# Patient Record
Sex: FEMALE | Race: WHITE | NOT HISPANIC OR LATINO | URBAN - METROPOLITAN AREA
[De-identification: names, ages, dates, MRNs, and addresses within clinical notes are randomized per-mention and may not be internally consistent; named-entity substitution may affect disease eponyms.]

---

## 2023-04-07 ENCOUNTER — INPATIENT (INPATIENT)
Facility: HOSPITAL | Age: 23
LOS: 1 days | Discharge: HOME | End: 2023-04-09
Attending: SURGERY | Admitting: SURGERY
Payer: COMMERCIAL

## 2023-04-07 VITALS
RESPIRATION RATE: 16 BRPM | TEMPERATURE: 99 F | HEART RATE: 96 BPM | SYSTOLIC BLOOD PRESSURE: 114 MMHG | OXYGEN SATURATION: 100 % | DIASTOLIC BLOOD PRESSURE: 68 MMHG

## 2023-04-07 DIAGNOSIS — K35.80 UNSPECIFIED ACUTE APPENDICITIS: ICD-10-CM

## 2023-04-07 LAB
ALBUMIN SERPL ELPH-MCNC: 4.5 G/DL — SIGNIFICANT CHANGE UP (ref 3.3–5)
ALP SERPL-CCNC: 67 U/L — SIGNIFICANT CHANGE UP (ref 40–120)
ALT FLD-CCNC: 10 U/L — SIGNIFICANT CHANGE UP (ref 4–33)
ANION GAP SERPL CALC-SCNC: 12 MMOL/L — SIGNIFICANT CHANGE UP (ref 7–14)
APPEARANCE UR: CLEAR — SIGNIFICANT CHANGE UP
APTT BLD: 29.4 SEC — SIGNIFICANT CHANGE UP (ref 27–36.3)
AST SERPL-CCNC: 14 U/L — SIGNIFICANT CHANGE UP (ref 4–32)
BACTERIA # UR AUTO: NEGATIVE — SIGNIFICANT CHANGE UP
BASOPHILS # BLD AUTO: 0.03 K/UL — SIGNIFICANT CHANGE UP (ref 0–0.2)
BASOPHILS NFR BLD AUTO: 0.3 % — SIGNIFICANT CHANGE UP (ref 0–2)
BILIRUB SERPL-MCNC: 0.4 MG/DL — SIGNIFICANT CHANGE UP (ref 0.2–1.2)
BILIRUB UR-MCNC: NEGATIVE — SIGNIFICANT CHANGE UP
BLD GP AB SCN SERPL QL: NEGATIVE — SIGNIFICANT CHANGE UP
BUN SERPL-MCNC: 7 MG/DL — SIGNIFICANT CHANGE UP (ref 7–23)
CALCIUM SERPL-MCNC: 9.5 MG/DL — SIGNIFICANT CHANGE UP (ref 8.4–10.5)
CHLORIDE SERPL-SCNC: 103 MMOL/L — SIGNIFICANT CHANGE UP (ref 98–107)
CO2 SERPL-SCNC: 22 MMOL/L — SIGNIFICANT CHANGE UP (ref 22–31)
COLOR SPEC: SIGNIFICANT CHANGE UP
CREAT SERPL-MCNC: 0.81 MG/DL — SIGNIFICANT CHANGE UP (ref 0.5–1.3)
DIFF PNL FLD: ABNORMAL
EGFR: 105 ML/MIN/1.73M2 — SIGNIFICANT CHANGE UP
EOSINOPHIL # BLD AUTO: 0.14 K/UL — SIGNIFICANT CHANGE UP (ref 0–0.5)
EOSINOPHIL NFR BLD AUTO: 1.2 % — SIGNIFICANT CHANGE UP (ref 0–6)
EPI CELLS # UR: 2 /HPF — SIGNIFICANT CHANGE UP (ref 0–5)
FLUAV AG NPH QL: SIGNIFICANT CHANGE UP
FLUBV AG NPH QL: SIGNIFICANT CHANGE UP
GLUCOSE SERPL-MCNC: 70 MG/DL — SIGNIFICANT CHANGE UP (ref 70–99)
GLUCOSE UR QL: NEGATIVE — SIGNIFICANT CHANGE UP
HCT VFR BLD CALC: 39.2 % — SIGNIFICANT CHANGE UP (ref 34.5–45)
HGB BLD-MCNC: 12.8 G/DL — SIGNIFICANT CHANGE UP (ref 11.5–15.5)
IANC: 6.73 K/UL — SIGNIFICANT CHANGE UP (ref 1.8–7.4)
IMM GRANULOCYTES NFR BLD AUTO: 0.3 % — SIGNIFICANT CHANGE UP (ref 0–0.9)
INR BLD: 1.14 RATIO — SIGNIFICANT CHANGE UP (ref 0.88–1.16)
KETONES UR-MCNC: NEGATIVE — SIGNIFICANT CHANGE UP
LEUKOCYTE ESTERASE UR-ACNC: NEGATIVE — SIGNIFICANT CHANGE UP
LIDOCAIN IGE QN: 21 U/L — SIGNIFICANT CHANGE UP (ref 7–60)
LYMPHOCYTES # BLD AUTO: 2.87 K/UL — SIGNIFICANT CHANGE UP (ref 1–3.3)
LYMPHOCYTES # BLD AUTO: 25.5 % — SIGNIFICANT CHANGE UP (ref 13–44)
MCHC RBC-ENTMCNC: 29.6 PG — SIGNIFICANT CHANGE UP (ref 27–34)
MCHC RBC-ENTMCNC: 32.7 GM/DL — SIGNIFICANT CHANGE UP (ref 32–36)
MCV RBC AUTO: 90.5 FL — SIGNIFICANT CHANGE UP (ref 80–100)
MONOCYTES # BLD AUTO: 1.46 K/UL — HIGH (ref 0–0.9)
MONOCYTES NFR BLD AUTO: 13 % — SIGNIFICANT CHANGE UP (ref 2–14)
NEUTROPHILS # BLD AUTO: 6.73 K/UL — SIGNIFICANT CHANGE UP (ref 1.8–7.4)
NEUTROPHILS NFR BLD AUTO: 59.7 % — SIGNIFICANT CHANGE UP (ref 43–77)
NITRITE UR-MCNC: NEGATIVE — SIGNIFICANT CHANGE UP
NRBC # BLD: 0 /100 WBCS — SIGNIFICANT CHANGE UP (ref 0–0)
NRBC # FLD: 0 K/UL — SIGNIFICANT CHANGE UP (ref 0–0)
PH UR: 7 — SIGNIFICANT CHANGE UP (ref 5–8)
PLATELET # BLD AUTO: 221 K/UL — SIGNIFICANT CHANGE UP (ref 150–400)
POTASSIUM SERPL-MCNC: 4.5 MMOL/L — SIGNIFICANT CHANGE UP (ref 3.5–5.3)
POTASSIUM SERPL-SCNC: 4.5 MMOL/L — SIGNIFICANT CHANGE UP (ref 3.5–5.3)
PROT SERPL-MCNC: 7.4 G/DL — SIGNIFICANT CHANGE UP (ref 6–8.3)
PROT UR-MCNC: NEGATIVE — SIGNIFICANT CHANGE UP
PROTHROM AB SERPL-ACNC: 13.3 SEC — SIGNIFICANT CHANGE UP (ref 10.5–13.4)
RBC # BLD: 4.33 M/UL — SIGNIFICANT CHANGE UP (ref 3.8–5.2)
RBC # FLD: 12.3 % — SIGNIFICANT CHANGE UP (ref 10.3–14.5)
RBC CASTS # UR COMP ASSIST: 7 /HPF — HIGH (ref 0–4)
RH IG SCN BLD-IMP: POSITIVE — SIGNIFICANT CHANGE UP
RH IG SCN BLD-IMP: POSITIVE — SIGNIFICANT CHANGE UP
RSV RNA NPH QL NAA+NON-PROBE: SIGNIFICANT CHANGE UP
SARS-COV-2 RNA SPEC QL NAA+PROBE: SIGNIFICANT CHANGE UP
SODIUM SERPL-SCNC: 137 MMOL/L — SIGNIFICANT CHANGE UP (ref 135–145)
SP GR SPEC: 1.01 — SIGNIFICANT CHANGE UP (ref 1.01–1.05)
UROBILINOGEN FLD QL: SIGNIFICANT CHANGE UP
WBC # BLD: 11.26 K/UL — HIGH (ref 3.8–10.5)
WBC # FLD AUTO: 11.26 K/UL — HIGH (ref 3.8–10.5)
WBC UR QL: 1 /HPF — SIGNIFICANT CHANGE UP (ref 0–5)

## 2023-04-07 PROCEDURE — 99282 EMERGENCY DEPT VISIT SF MDM: CPT

## 2023-04-07 PROCEDURE — 74177 CT ABD & PELVIS W/CONTRAST: CPT | Mod: 26,MG

## 2023-04-07 PROCEDURE — G1004: CPT

## 2023-04-07 PROCEDURE — 76830 TRANSVAGINAL US NON-OB: CPT | Mod: 26

## 2023-04-07 PROCEDURE — 99285 EMERGENCY DEPT VISIT HI MDM: CPT

## 2023-04-07 RX ORDER — ACETAMINOPHEN 500 MG
975 TABLET ORAL ONCE
Refills: 0 | Status: COMPLETED | OUTPATIENT
Start: 2023-04-07 | End: 2023-04-07

## 2023-04-07 RX ORDER — SODIUM CHLORIDE 9 MG/ML
1000 INJECTION, SOLUTION INTRAVENOUS ONCE
Refills: 0 | Status: COMPLETED | OUTPATIENT
Start: 2023-04-07 | End: 2023-04-07

## 2023-04-07 RX ORDER — PIPERACILLIN AND TAZOBACTAM 4; .5 G/20ML; G/20ML
3.38 INJECTION, POWDER, LYOPHILIZED, FOR SOLUTION INTRAVENOUS ONCE
Refills: 0 | Status: COMPLETED | OUTPATIENT
Start: 2023-04-07 | End: 2023-04-07

## 2023-04-07 RX ADMIN — Medication 975 MILLIGRAM(S): at 16:37

## 2023-04-07 RX ADMIN — PIPERACILLIN AND TAZOBACTAM 200 GRAM(S): 4; .5 INJECTION, POWDER, LYOPHILIZED, FOR SOLUTION INTRAVENOUS at 21:17

## 2023-04-07 RX ADMIN — SODIUM CHLORIDE 1000 MILLILITER(S): 9 INJECTION, SOLUTION INTRAVENOUS at 21:17

## 2023-04-07 RX ADMIN — Medication 975 MILLIGRAM(S): at 17:07

## 2023-04-07 NOTE — ED PROVIDER NOTE - OBJECTIVE STATEMENT
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 Pt notes that pain started yesterday in her B/L upper abd and rt flank and has now localized to her RLQ. Reports 1 episode of NBNB vomiting last night. Pt went to urgent care today where they found blood in her urine and sent her here to r/o appendicitis. She is currently sexually active on OCP without protection in monogamous relationship. Denies fevers, chills, nausea, dysuria, freq, urgency, vaginal discharge, foul odors.

## 2023-04-07 NOTE — CONSULT NOTE ADULT - SUBJECTIVE AND OBJECTIVE BOX
GYN Consult Note    23y G_P_  LMP * presents with   HPI:      OB/GYN HISTORY:   Physician:   Ob:   - G1:   - G2:   Gyn: Denies fibroids, cysts, PCOS, endometriosis, or history of genital lesions   PMH: Denies  PAST MEDICAL & SURGICAL HISTORY:  Hirschsprung's disease      No significant past surgical history        PSH: Denies   Meds:  MEDICATIONS  (STANDING):    MEDICATIONS  (PRN):    Allergies:   Allergies    No Known Allergies    Intolerances          REVIEW OF SYSTEMS  General: denies fevers, chills, tiredness  Skin/Breast: denies breast pain  Respiratory and Thorax: denies shortness of breath or cough  Cardiovascular: denies chest pain, palpitations  Gastrointestinal: denies abdominal pain, nausea/ vomiting	  Genitourinary: denies dysuria, increased urinary frequency, urgency, abnormal vaginal discharge,   Constitutional, Cardiovascular, Respiratory, Gastrointestinal, Genitourinary, Musculoskeletal and Integumentary review of systems are normal except as noted. 	      Vital Signs Last 24 Hrs  T(C): 36.7 (2023 17:36), Max: 37.2 (2023 13:50)  T(F): 98 (2023 17:36), Max: 98.9 (2023 13:50)  HR: 65 (2023 17:36) (65 - 96)  BP: 105/64 (2023 17:36) (105/64 - 114/68)  BP(mean): --  RR: 16 (2023 17:36) (16 - 16)  SpO2: 100% (2023 17:36) (100% - 100%)    Parameters below as of 2023 17:36  Patient On (Oxygen Delivery Method): room air        PHYSICAL EXAM: Chaperoned w/ RN at bedside.   Gen: NAD, alert and oriented x 3  Cardiovascular: regular   Respiratory: breathing comfortably on RA  Abd: soft, non tender, non-distended  Pelvic: closed/long, no CMT, Uterus: normal size, non tender  Adnexa: non tender, no palpable masses  Extremities: NTBL  Skin: warm and well perfused      LABS:                        12.8   11.26 )-----------( 221      ( 2023 17:00 )             39.2     04-07    137  |  103  |  7   ----------------------------<  70  4.5   |  22  |  0.81    Ca    9.5      2023 17:00    TPro  7.4  /  Alb  4.5  /  TBili  0.4  /  DBili  x   /  AST  14  /  ALT  10  /  AlkPhos  67  04-07      Urinalysis Basic - ( 2023 17:00 )    Color: Light Yellow / Appearance: Clear / S.012 / pH: x  Gluc: x / Ketone: Negative  / Bili: Negative / Urobili: <2 mg/dL   Blood: x / Protein: Negative / Nitrite: Negative   Leuk Esterase: Negative / RBC: 7 /HPF / WBC 1 /HPF   Sq Epi: x / Non Sq Epi: x / Bacteria: Negative        RADIOLOGY & ADDITIONAL STUDIES:   GYN Consult Note    23y G0 LMP 3/28 presents with abdominal pain x2 days, now radiating to the RLQ   HPI: Pt reports sharp abdominal pain that started yesturday that initally radiated to her back, now moved down to her RLQ       OB/GYN HISTORY:   Physician:   Ob:   - G1:   - G2:   Gyn: Denies fibroids, cysts, PCOS, endometriosis, or history of genital lesions   PMH: Denies  PAST MEDICAL & SURGICAL HISTORY:  Hirschsprung's disease      No significant past surgical history        PSH: Denies   Meds:  MEDICATIONS  (STANDING):    MEDICATIONS  (PRN):    Allergies:   Allergies    No Known Allergies    Intolerances          REVIEW OF SYSTEMS  General: denies fevers, chills, tiredness  Skin/Breast: denies breast pain  Respiratory and Thorax: denies shortness of breath or cough  Cardiovascular: denies chest pain, palpitations  Gastrointestinal: denies abdominal pain, nausea/ vomiting	  Genitourinary: denies dysuria, increased urinary frequency, urgency, abnormal vaginal discharge,   Constitutional, Cardiovascular, Respiratory, Gastrointestinal, Genitourinary, Musculoskeletal and Integumentary review of systems are normal except as noted. 	      Vital Signs Last 24 Hrs  T(C): 36.7 (2023 17:36), Max: 37.2 (2023 13:50)  T(F): 98 (2023 17:36), Max: 98.9 (2023 13:50)  HR: 65 (2023 17:36) (65 - 96)  BP: 105/64 (2023 17:36) (105/64 - 114/68)  BP(mean): --  RR: 16 (2023 17:36) (16 - 16)  SpO2: 100% (2023 17:36) (100% - 100%)    Parameters below as of 2023 17:36  Patient On (Oxygen Delivery Method): room air        PHYSICAL EXAM: Chaperoned w/ RN at bedside.   Gen: NAD, alert and oriented x 3  Cardiovascular: regular   Respiratory: breathing comfortably on RA  Abd: soft, non tender, non-distended  Pelvic: closed/long, no CMT, Uterus: normal size, non tender  Adnexa: non tender, no palpable masses  Extremities: NTBL  Skin: warm and well perfused      LABS:                        12.8   11.26 )-----------( 221      ( 2023 17:00 )             39.2     04-07    137  |  103  |  7   ----------------------------<  70  4.5   |  22  |  0.81    Ca    9.5      2023 17:00    TPro  7.4  /  Alb  4.5  /  TBili  0.4  /  DBili  x   /  AST  14  /  ALT  10  /  AlkPhos  67  04-07      Urinalysis Basic - ( 2023 17:00 )    Color: Light Yellow / Appearance: Clear / S.012 / pH: x  Gluc: x / Ketone: Negative  / Bili: Negative / Urobili: <2 mg/dL   Blood: x / Protein: Negative / Nitrite: Negative   Leuk Esterase: Negative / RBC: 7 /HPF / WBC 1 /HPF   Sq Epi: x / Non Sq Epi: x / Bacteria: Negative        RADIOLOGY & ADDITIONAL STUDIES:   GYN Consult Note    23y G0 LMP 3/28 presents with abdominal pain x2 days, now radiating to the RLQ   HPI: Pt reports sharp abdominal pain that started yesterday that initially radiated to her back, now moved down to her RLQ. Associated lower abdominal pressure and bloating. Baseline nausea with emesis x1 overnight. Last ate at 10pm. Denies pelvic pain, vaginal bleeding, or vaginal discharge. Remainder of ROS as below       OB/GYN HISTORY:   Physician: Dr. Barth   Ob: Never pregnant   Gyn: +HPV, now cleared. Denies fibroids, cysts, PCOS, endometriosis, or history of genital lesions   PMH: Denies  PAST MEDICAL & SURGICAL HISTORY:  Hirschsprung's disease      No significant past surgical history        PSH: Denies   Meds:  MEDICATIONS  (STANDING):    MEDICATIONS  (PRN):    Allergies:   Allergies    No Known Allergies    Intolerances          REVIEW OF SYSTEMS  General: denies fevers, chills, tiredness  Skin/Breast: denies breast pain  Respiratory and Thorax: denies shortness of breath or cough  Cardiovascular: denies chest pain, palpitations  Gastrointestinal: denies abdominal pain, nausea/ vomiting	  Genitourinary: denies dysuria, increased urinary frequency, urgency, abnormal vaginal discharge,   Constitutional, Cardiovascular, Respiratory, Gastrointestinal, Genitourinary, Musculoskeletal and Integumentary review of systems are normal except as noted. 	      Vital Signs Last 24 Hrs  T(C): 36.7 (2023 17:36), Max: 37.2 (2023 13:50)  T(F): 98 (2023 17:36), Max: 98.9 (2023 13:50)  HR: 65 (2023 17:36) (65 - 96)  BP: 105/64 (2023 17:36) (105/64 - 114/68)  BP(mean): --  RR: 16 (2023 17:36) (16 - 16)  SpO2: 100% (2023 17:36) (100% - 100%)    Parameters below as of 2023 17:36  Patient On (Oxygen Delivery Method): room air        PHYSICAL EXAM: Chaperoned w/ RN at bedside.   Gen: NAD, alert and oriented x 3  Cardiovascular: regular   Respiratory: breathing comfortably on RA  Abd: soft, non tender, non-distended  Pelvic: closed/long, no CMT, Uterus: normal size, non tender  Adnexa: non tender, no palpable masses  Extremities: NTBL  Skin: warm and well perfused      LABS:                        12.8   11.26 )-----------( 221      ( 2023 17:00 )             39.2     04-    137  |  103  |  7   ----------------------------<  70  4.5   |  22  |  0.81    Ca    9.5      2023 17:00    TPro  7.4  /  Alb  4.5  /  TBili  0.4  /  DBili  x   /  AST  14  /  ALT  10  /  AlkPhos  67  04-07      Urinalysis Basic - ( 2023 17:00 )    Color: Light Yellow / Appearance: Clear / S.012 / pH: x  Gluc: x / Ketone: Negative  / Bili: Negative / Urobili: <2 mg/dL   Blood: x / Protein: Negative / Nitrite: Negative   Leuk Esterase: Negative / RBC: 7 /HPF / WBC 1 /HPF   Sq Epi: x / Non Sq Epi: x / Bacteria: Negative        RADIOLOGY & ADDITIONAL STUDIES:   GYN Consult Note    23y G0 LMP 3/28 presents with abdominal pain x2 days, now radiating to the RLQ   HPI: Pt reports sharp abdominal pain that started yesterday that initially radiated to her back, now moved down to her RLQ. Associated lower abdominal pressure and bloating. Baseline nausea with emesis x1 overnight. Last ate at 10pm. Denies pelvic pain, vaginal bleeding, or vaginal discharge. Remainder of ROS as below       OB/GYN HISTORY:   Physician: Dr. Barth   Ob: Never pregnant   Gyn: +HPV, now cleared. Denies fibroids, cysts, PCOS, endometriosis, or history of genital lesions   PMH: Hirschsprung's disease s/p surgical correction as a    PSH: Denies   Meds: OCPs - takes continuously   Allergies: No Known Allergies      REVIEW OF SYSTEMS  General: denies fevers, chills, tiredness  Respiratory and Thorax: denies shortness of breath or cough  Cardiovascular: denies chest pain, palpitations  Gastrointestinal: As above  Genitourinary: denies dysuria, increased urinary frequency, urgency, abnormal vaginal discharge,   Constitutional, Cardiovascular, Respiratory, Gastrointestinal, Genitourinary, Musculoskeletal and Integumentary review of systems are normal except as noted. 	      Vital Signs Last 24 Hrs  T(C): 36.7 (2023 17:36), Max: 37.2 (2023 13:50)  T(F): 98 (2023 17:36), Max: 98.9 (2023 13:50)  HR: 65 (2023 17:36) (65 - 96)  BP: 105/64 (2023 17:36) (105/64 - 114/68)  BP(mean): --  RR: 16 (2023 17:36) (16 - 16)  SpO2: 100% (2023 17:36) (100% - 100%)    Parameters below as of 2023 17:36  Patient On (Oxygen Delivery Method): room air    PHYSICAL EXAM: Chaperoned w/ RN at bedside.   Gen: NAD, alert and oriented x 3  Cardiovascular: regular   Respiratory: breathing comfortably on RA  Abd: soft, tender in RLQ at the level of iliac crest, no tenderness lower. No rebound or guarding   Pelvic: closed/long, no CMT, Uterus: normal size, non tender  Adnexa: non tender, no palpable masses  Extremities: NTBL  Skin: warm and well perfused    LABS:                        12.8   11.26 )-----------( 221      ( 2023 17:00 )             39.2         137  |  103  |  7   ----------------------------<  70  4.5   |  22  |  0.81    Ca    9.5      2023 17:00    TPro  7.4  /  Alb  4.5  /  TBili  0.4  /  DBili  x   /  AST  14  /  ALT  10  /  AlkPhos  67  04-      Urinalysis Basic - ( 2023 17:00 )    Color: Light Yellow / Appearance: Clear / S.012 / pH: x  Gluc: x / Ketone: Negative  / Bili: Negative / Urobili: <2 mg/dL   Blood: x / Protein: Negative / Nitrite: Negative   Leuk Esterase: Negative / RBC: 7 /HPF / WBC 1 /HPF   Sq Epi: x / Non Sq Epi: x / Bacteria: Negative        RADIOLOGY & ADDITIONAL STUDIES:    < from: CT Abdomen and Pelvis w/ IV Cont (23 @ 18:52) >  PROCEDURE:  CT of the Abdomen and Pelvis was performed.  Sagittal and coronal reformats were performed.    FINDINGS:  LOWER CHEST: Within normal limits.    LIVER: Within normal limits.  BILE DUCTS: Normal caliber.  GALLBLADDER: Within normal limits.  SPLEEN: Within normal limits.  PANCREAS: Within normal limits.  ADRENALS: Within normal limits.  KIDNEYS/URETERS: Within normal limits.    BLADDER: Within normal limits.  REPRODUCTIVE ORGANS: Uterus and left adnexa within normal limits. Air   containing tubular structure right adnexa appears separate from the ovary   and separate from the colon raising suspicion of infected air filled   fallopian tube.    BOWEL: No bowel obstruction. Appendix is enlarged measuring up to 1.5 cm   with mural thickening. A 0.5 cm appendicolith is noted (2:77). There is a   small abscess at the tip of the appendix measuring up to 2.6 cm.  PERITONEUM: No ascites.  VESSELS: Within normal limits. Circumaortic left renal vein, a normal   variant  RETROPERITONEUM/LYMPH NODES: Prominent right lower quadrant lymph nodes,   for reference measuring 1.1 x 1.0 cm (2:64).  ABDOMINAL WALL: Within normal limits.  BONES: Within normal limits.    IMPRESSION:  Appendicitis with fecalith and small associated abscess at the tip  Tubular air-containing structure in the right pelvis posterior to the   right ovary and adjacent to the sigmoid colon suggestive of infected air   filled fallopian tube.    < end of copied text >  < from: US Transvaginal (23 @ 21:44) >    INTERPRETATION:  CLINICAL INFORMATION: Right lower quadrant pain with   appendicitis on CT and questionable collection.    LMP: 3/20/2023    COMPARISON: Same day CT abdomen pelvis    TECHNIQUE:  Endovaginal pelvic sonogram only. Color and Spectral Doppler was   performed.    FINDINGS:  Uterus: 6.9 cm x 2.5 cm x 2.9 cm. Within normal limits.  Endometrium: 4 mm. Within normal limits.    Rightovary: 2.9 cm x 1.8 cm x 2.4 cm. Within normal limits. Normal   arterial and venous waveforms.  Left ovary: 3.1 cm x 1.2 cm x 3.1 cm. Within normal limits. Normal   arterial and venous waveforms.    Adnexa: No discrete collections in the right or left adnexa. In the   region of the right adnexa, there is large volume of air and contrast   signature suggesting small bowel loop. No hydrosalpinx identified.    Miscellaneous: Layering debris in the urinary bladder.    Fluid: None.    IMPRESSION:    Normal-appearing uterus and ovaries. No sonographic evidence of ovarian   torsion.    No discrete collections in the right or left adnexa. Suggestion of small   bowel loop in the region of the right adnexa.    Layering debris in the urinary bladder, correlate with urinalysis for   infection.    --- End of Report ---    < end of copied text >

## 2023-04-07 NOTE — ED PROVIDER NOTE - CLINICAL SUMMARY MEDICAL DECISION MAKING FREE TEXT BOX
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 She is currently sexually active on OCP without protection in monogamous relationship. PE reveals soft, non-distended abd w/ TTP to RLQ without rebound tenderness. Bimanual examination revealed no significant findings. Plan for labs, hcg, supportive care and CT A/P to r/o appendicitis and will consider transvaginal US with reassessment. 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 She is currently sexually active on OCP without protection in monogamous relationship. PE reveals soft, non-distended abd w/ TTP to RLQ without rebound tenderness. Bimanual examination revealed no significant findings. Plan for labs, hcg, supportive care and CT A/P to r/o appendicitis and will consider transvaginal US with reassessment.

## 2023-04-07 NOTE — CONSULT NOTE ADULT - ATTENDING COMMENTS
GYN Attending    24 y/o G0 presenting with RLQ pain and appendicitis. No apparent GYN pathology  GYN available if needed for OR    N Sample-MD Jorge

## 2023-04-07 NOTE — ED PROVIDER NOTE - NS ED ATTENDING STATEMENT MOD
This was a shared visit with the ROBIN. I reviewed and verified the documentation and independently performed the documented:

## 2023-04-07 NOTE — ED ADULT NURSE NOTE - OBJECTIVE STATEMENT
Pt is a 22 y/o Female, A&Ox4, ambulatory with PMH of Hirschsprung's disease with abdominal surgery as a child. Pt presents to the ED with c/o RLQ abdominal pain x 2 days. Pt states pain is worsening, rating 5/10. Pt denies chest pain, SOB, fever, cough, chills, diarrhea, constipation, nausea, vomiting, numbness/tingling, or any urinary symptoms at this time. Respirations even and unlabored, chest rise equal b/l. Abdomen soft, nondistended, and tender in RLQ. No rebound tenderness noted on assessment. 20g IVL placed in LAC. Labs collected and sent. Medications administered as ordered, see MAR. Safety maintained throughout. Will continue to monitor.

## 2023-04-07 NOTE — ED ADULT TRIAGE NOTE - CHIEF COMPLAINT QUOTE
Pt sent from urgent care for r/o appendicitis. C/o several days of RLQ pain and one episode of vomiting during the night, denies nausea at this time.

## 2023-04-07 NOTE — ED ADULT NURSE REASSESSMENT NOTE - NS ED NURSE REASSESS COMMENT FT1
Patient A&Ox4, respirations even and unlabored, states improvement in condition, denies any complaints. Vitals stable. Medications provided per orders. Patient taken to US at this time.

## 2023-04-07 NOTE — CONSULT NOTE ADULT - ASSESSMENT
23y G0 LMP 3/28 presents with abdominal pain x2 days, now radiating to the RLQ. Pt found to have appendicitis. GYN consulted with concern for "infected air filled fallopian tube" on CT scan    - Further characterization of the fallopian tube with TVUS which states "No discrete collections in the right or left adnexa. In the region of the right adnexa, there is large volume of air and contrast   signature suggesting small bowel loop. No hydrosalpinx identified.  - Pt with out adnexal tenderness or gyn symptoms.   - Management of appendicitis per primary team     DW: Dr. Renetta Worthy, pgy-2

## 2023-04-08 DIAGNOSIS — R10.31 RIGHT LOWER QUADRANT PAIN: ICD-10-CM

## 2023-04-08 LAB
ANION GAP SERPL CALC-SCNC: 20 MMOL/L — HIGH (ref 7–14)
BUN SERPL-MCNC: 11 MG/DL — SIGNIFICANT CHANGE UP (ref 7–23)
CALCIUM SERPL-MCNC: 9.1 MG/DL — SIGNIFICANT CHANGE UP (ref 8.4–10.5)
CHLORIDE SERPL-SCNC: 101 MMOL/L — SIGNIFICANT CHANGE UP (ref 98–107)
CO2 SERPL-SCNC: 15 MMOL/L — LOW (ref 22–31)
CREAT SERPL-MCNC: 0.77 MG/DL — SIGNIFICANT CHANGE UP (ref 0.5–1.3)
EGFR: 111 ML/MIN/1.73M2 — SIGNIFICANT CHANGE UP
GLUCOSE SERPL-MCNC: 44 MG/DL — CRITICAL LOW (ref 70–99)
HCT VFR BLD CALC: 38.9 % — SIGNIFICANT CHANGE UP (ref 34.5–45)
HGB BLD-MCNC: 12.5 G/DL — SIGNIFICANT CHANGE UP (ref 11.5–15.5)
MAGNESIUM SERPL-MCNC: 1.9 MG/DL — SIGNIFICANT CHANGE UP (ref 1.6–2.6)
MCHC RBC-ENTMCNC: 29.1 PG — SIGNIFICANT CHANGE UP (ref 27–34)
MCHC RBC-ENTMCNC: 32.1 GM/DL — SIGNIFICANT CHANGE UP (ref 32–36)
MCV RBC AUTO: 90.7 FL — SIGNIFICANT CHANGE UP (ref 80–100)
NRBC # BLD: 0 /100 WBCS — SIGNIFICANT CHANGE UP (ref 0–0)
NRBC # FLD: 0 K/UL — SIGNIFICANT CHANGE UP (ref 0–0)
PHOSPHATE SERPL-MCNC: 3.4 MG/DL — SIGNIFICANT CHANGE UP (ref 2.5–4.5)
PLATELET # BLD AUTO: 184 K/UL — SIGNIFICANT CHANGE UP (ref 150–400)
POTASSIUM SERPL-MCNC: 4 MMOL/L — SIGNIFICANT CHANGE UP (ref 3.5–5.3)
POTASSIUM SERPL-SCNC: 4 MMOL/L — SIGNIFICANT CHANGE UP (ref 3.5–5.3)
RBC # BLD: 4.29 M/UL — SIGNIFICANT CHANGE UP (ref 3.8–5.2)
RBC # FLD: 12.3 % — SIGNIFICANT CHANGE UP (ref 10.3–14.5)
SODIUM SERPL-SCNC: 136 MMOL/L — SIGNIFICANT CHANGE UP (ref 135–145)
WBC # BLD: 9.33 K/UL — SIGNIFICANT CHANGE UP (ref 3.8–10.5)
WBC # FLD AUTO: 9.33 K/UL — SIGNIFICANT CHANGE UP (ref 3.8–10.5)

## 2023-04-08 PROCEDURE — 99223 1ST HOSP IP/OBS HIGH 75: CPT

## 2023-04-08 RX ORDER — IBUPROFEN 200 MG
400 TABLET ORAL EVERY 6 HOURS
Refills: 0 | Status: DISCONTINUED | OUTPATIENT
Start: 2023-04-08 | End: 2023-04-09

## 2023-04-08 RX ORDER — PIPERACILLIN AND TAZOBACTAM 4; .5 G/20ML; G/20ML
3.38 INJECTION, POWDER, LYOPHILIZED, FOR SOLUTION INTRAVENOUS EVERY 8 HOURS
Refills: 0 | Status: DISCONTINUED | OUTPATIENT
Start: 2023-04-08 | End: 2023-04-09

## 2023-04-08 RX ORDER — ACETAMINOPHEN 500 MG
975 TABLET ORAL EVERY 6 HOURS
Refills: 0 | Status: DISCONTINUED | OUTPATIENT
Start: 2023-04-08 | End: 2023-04-09

## 2023-04-08 RX ORDER — SODIUM CHLORIDE 9 MG/ML
1000 INJECTION, SOLUTION INTRAVENOUS
Refills: 0 | Status: DISCONTINUED | OUTPATIENT
Start: 2023-04-08 | End: 2023-04-08

## 2023-04-08 RX ORDER — ENOXAPARIN SODIUM 100 MG/ML
40 INJECTION SUBCUTANEOUS EVERY 24 HOURS
Refills: 0 | Status: DISCONTINUED | OUTPATIENT
Start: 2023-04-08 | End: 2023-04-09

## 2023-04-08 RX ORDER — IBUPROFEN 200 MG
400 TABLET ORAL ONCE
Refills: 0 | Status: COMPLETED | OUTPATIENT
Start: 2023-04-08 | End: 2023-04-08

## 2023-04-08 RX ORDER — ACETAMINOPHEN 500 MG
975 TABLET ORAL EVERY 6 HOURS
Refills: 0 | Status: DISCONTINUED | OUTPATIENT
Start: 2023-04-08 | End: 2023-04-08

## 2023-04-08 RX ORDER — ACETAMINOPHEN 500 MG
975 TABLET ORAL ONCE
Refills: 0 | Status: COMPLETED | OUTPATIENT
Start: 2023-04-08 | End: 2023-04-08

## 2023-04-08 RX ADMIN — PIPERACILLIN AND TAZOBACTAM 25 GRAM(S): 4; .5 INJECTION, POWDER, LYOPHILIZED, FOR SOLUTION INTRAVENOUS at 21:43

## 2023-04-08 RX ADMIN — Medication 975 MILLIGRAM(S): at 00:58

## 2023-04-08 RX ADMIN — PIPERACILLIN AND TAZOBACTAM 25 GRAM(S): 4; .5 INJECTION, POWDER, LYOPHILIZED, FOR SOLUTION INTRAVENOUS at 16:06

## 2023-04-08 RX ADMIN — SODIUM CHLORIDE 90 MILLILITER(S): 9 INJECTION, SOLUTION INTRAVENOUS at 09:46

## 2023-04-08 RX ADMIN — Medication 400 MILLIGRAM(S): at 00:58

## 2023-04-08 RX ADMIN — ENOXAPARIN SODIUM 40 MILLIGRAM(S): 100 INJECTION SUBCUTANEOUS at 11:43

## 2023-04-08 NOTE — H&P ADULT - ASSESSMENT
23F pmh Hirschsprung (s/p pull through/rectoplasty) presenting with abdominal pain since 6pm last night.    Plan:  - Admit to Dr. Galindo  - NPO, IVF, Abx  - Zosyn  - Ibuprofen  - Tylenol  - Dvt ppx    Iron MAHARAJ Team Surgery  [id number]

## 2023-04-08 NOTE — H&P ADULT - NSHPPHYSICALEXAM_GEN_ALL_CORE
Vital Signs Last 24 Hrs  T(C): 36.6 (04-08-23 @ 02:17), Max: 37.2 (04-07-23 @ 13:50)  T(F): 97.9 (04-08-23 @ 02:17), Max: 98.9 (04-07-23 @ 13:50)  HR: 60 (04-08-23 @ 02:17) (60 - 96)  BP: 106/60 (04-08-23 @ 02:17) (105/64 - 115/62)  BP(mean): --  RR: 18 (04-08-23 @ 02:17) (16 - 18)  SpO2: 100% (04-08-23 @ 02:17) (99% - 100%)    General: well developed, well nourished, NAD  Neuro: alert and oriented, no focal deficits, moves all extremities spontaneously  HEENT: NCAT, EOMI, anicteric, mucosa moist  Respiratory: airway patent  CVS: regular rate  Abdomen: soft, tender in RLQ  Extremities: no edema, sensation and movement grossly intact  Skin: warm, dry, appropriate color

## 2023-04-08 NOTE — H&P ADULT - HISTORY OF PRESENT ILLNESS
23F pmh Hirschsprung (s/p pull through/rectoplasty) presenting with abdominal pain since 6pm last night.    Patient reports had nachos and then afterward had generalized abdominal pain that later migrated to the RLQ, sharp and mostly continent with 10min of relief intermittently. Had one episode of emesis but since then no nause. No F/N/C. No dysuria or vaginal discharge. Pain exacerbated by bumps on car ride.    Is sexually active with one partner. Had STI in past. No tobacco use, occasional alcohol use.  CT showing 1.5cm appendix with abscess at tip, appendicolith, and possible infection r fallopian tube vs ovary.

## 2023-04-08 NOTE — H&P ADULT - TIME BILLING
Thorough assessment of abdominal pain in a patient with complicated surgical history and multiple findings in the R. lower quadrant.

## 2023-04-08 NOTE — H&P ADULT - NSHPLABSRESULTS_GEN_ALL_CORE
----------  LABORATORY DATA:  ----------                        12.8   11.26 )-----------( 221      ( 2023 17:00 )             39.2                   137  |  103  |  7   ----------------------------<  70  4.5   |  22  |  0.81    Ca    9.5      2023 17:00    TPro  7.4  /  Alb  4.5  /  TBili  0.4  /  DBili  x   /  AST  14  /  ALT  10  /  AlkPhos  67      LIVER FUNCTIONS - ( 2023 17:00 )  Alb: 4.5 g/dL / Pro: 7.4 g/dL / ALK PHOS: 67 U/L / ALT: 10 U/L / AST: 14 U/L / GGT: x           PT/INR - ( 2023 20:00 )   PT: 13.3 sec;   INR: 1.14 ratio         PTT - ( 2023 20:00 )  PTT:29.4 sec  Urinalysis Basic - ( 2023 17:00 )    Color: Light Yellow / Appearance: Clear / S.012 / pH: x  Gluc: x / Ketone: Negative  / Bili: Negative / Urobili: <2 mg/dL   Blood: x / Protein: Negative / Nitrite: Negative   Leuk Esterase: Negative / RBC: 7 /HPF / WBC 1 /HPF   Sq Epi: x / Non Sq Epi: x / Bacteria: Negative        < from: CT Abdomen and Pelvis w/ IV Cont (23 @ 18:52) >    INTERPRETATION:  CLINICAL INFORMATION: Right lower quadrant pain.    COMPARISON: None.    CONTRAST/COMPLICATIONS:  IV Contrast: Omnipaque 350  90 cc administered   10 cc discarded  Oral Contrast: NONE  Complications: None reported at time of study completion    PROCEDURE:  CT of the Abdomen and Pelvis was performed.  Sagittal and coronal reformats were performed.    FINDINGS:  LOWER CHEST: Within normal limits.    LIVER: Within normal limits.  BILE DUCTS: Normal caliber.  GALLBLADDER: Within normal limits.  SPLEEN: Within normal limits.  PANCREAS: Within normal limits.  ADRENALS: Within normal limits.  KIDNEYS/URETERS: Within normal limits.    BLADDER: Within normal limits.  REPRODUCTIVE ORGANS: Uterus and left adnexa within normal limits. Air   containing tubular structure right adnexa appears separate from the ovary   and separate from the colon raising suspicion of infected air filled   fallopian tube.    BOWEL: No bowel obstruction. Appendix is enlarged measuring up to 1.5 cm   with mural thickening. A 0.5 cm appendicolith is noted (2:77). There is a   small abscess at the tip of the appendix measuring up to 2.6 cm.  PERITONEUM: No ascites.  VESSELS: Within normal limits. Circumaortic left renal vein, a normal   variant  RETROPERITONEUM/LYMPH NODES: Prominent right lower quadrant lymph nodes,   for reference measuring 1.1 x 1.0 cm (2:64).  ABDOMINAL WALL: Within normal limits.  BONES: Within normal limits.    IMPRESSION:  Appendicitis with fecalith and small associated abscess at the tip  Tubular air-containing structure in the right pelvis posterior to the   right ovary and adjacent to the sigmoid colon suggestive of infected air   filled fallopian tube.    < end of copied text >

## 2023-04-08 NOTE — PROVIDER CONTACT NOTE (OTHER) - ASSESSMENT
patient aox4, npo since midnight, patient is not diabetic, denies dizziness, vomiting or sweating. FS was done it was 49

## 2023-04-08 NOTE — H&P ADULT - ATTENDING COMMENTS
Abdominal pain and findings of small abscess near tip of appendix, possible infected fallopian tube.  Complicated sugical history.  Normal WBC.    -possible OR vs. Abx / observation

## 2023-04-09 ENCOUNTER — TRANSCRIPTION ENCOUNTER (OUTPATIENT)
Age: 23
End: 2023-04-09

## 2023-04-09 VITALS
RESPIRATION RATE: 17 BRPM | HEART RATE: 64 BPM | DIASTOLIC BLOOD PRESSURE: 57 MMHG | OXYGEN SATURATION: 100 % | SYSTOLIC BLOOD PRESSURE: 99 MMHG | TEMPERATURE: 98 F

## 2023-04-09 LAB
ANION GAP SERPL CALC-SCNC: 12 MMOL/L — SIGNIFICANT CHANGE UP (ref 7–14)
BUN SERPL-MCNC: 7 MG/DL — SIGNIFICANT CHANGE UP (ref 7–23)
CALCIUM SERPL-MCNC: 9.2 MG/DL — SIGNIFICANT CHANGE UP (ref 8.4–10.5)
CHLORIDE SERPL-SCNC: 106 MMOL/L — SIGNIFICANT CHANGE UP (ref 98–107)
CO2 SERPL-SCNC: 19 MMOL/L — LOW (ref 22–31)
CREAT SERPL-MCNC: 0.84 MG/DL — SIGNIFICANT CHANGE UP (ref 0.5–1.3)
EGFR: 100 ML/MIN/1.73M2 — SIGNIFICANT CHANGE UP
GLUCOSE SERPL-MCNC: 95 MG/DL — SIGNIFICANT CHANGE UP (ref 70–99)
HCT VFR BLD CALC: 36.4 % — SIGNIFICANT CHANGE UP (ref 34.5–45)
HGB BLD-MCNC: 12.3 G/DL — SIGNIFICANT CHANGE UP (ref 11.5–15.5)
MAGNESIUM SERPL-MCNC: 1.8 MG/DL — SIGNIFICANT CHANGE UP (ref 1.6–2.6)
MCHC RBC-ENTMCNC: 30.4 PG — SIGNIFICANT CHANGE UP (ref 27–34)
MCHC RBC-ENTMCNC: 33.8 GM/DL — SIGNIFICANT CHANGE UP (ref 32–36)
MCV RBC AUTO: 89.9 FL — SIGNIFICANT CHANGE UP (ref 80–100)
NRBC # BLD: 0 /100 WBCS — SIGNIFICANT CHANGE UP (ref 0–0)
NRBC # FLD: 0 K/UL — SIGNIFICANT CHANGE UP (ref 0–0)
PHOSPHATE SERPL-MCNC: 3 MG/DL — SIGNIFICANT CHANGE UP (ref 2.5–4.5)
PLATELET # BLD AUTO: 195 K/UL — SIGNIFICANT CHANGE UP (ref 150–400)
POTASSIUM SERPL-MCNC: 3.9 MMOL/L — SIGNIFICANT CHANGE UP (ref 3.5–5.3)
POTASSIUM SERPL-SCNC: 3.9 MMOL/L — SIGNIFICANT CHANGE UP (ref 3.5–5.3)
RBC # BLD: 4.05 M/UL — SIGNIFICANT CHANGE UP (ref 3.8–5.2)
RBC # FLD: 12.3 % — SIGNIFICANT CHANGE UP (ref 10.3–14.5)
SODIUM SERPL-SCNC: 137 MMOL/L — SIGNIFICANT CHANGE UP (ref 135–145)
WBC # BLD: 9.54 K/UL — SIGNIFICANT CHANGE UP (ref 3.8–10.5)
WBC # FLD AUTO: 9.54 K/UL — SIGNIFICANT CHANGE UP (ref 3.8–10.5)

## 2023-04-09 PROCEDURE — 99232 SBSQ HOSP IP/OBS MODERATE 35: CPT

## 2023-04-09 RX ORDER — IBUPROFEN 200 MG
1 TABLET ORAL
Qty: 0 | Refills: 0 | DISCHARGE
Start: 2023-04-09

## 2023-04-09 RX ORDER — ACETAMINOPHEN 500 MG
3 TABLET ORAL
Qty: 0 | Refills: 0 | DISCHARGE
Start: 2023-04-09

## 2023-04-09 RX ADMIN — ENOXAPARIN SODIUM 40 MILLIGRAM(S): 100 INJECTION SUBCUTANEOUS at 11:31

## 2023-04-09 RX ADMIN — PIPERACILLIN AND TAZOBACTAM 25 GRAM(S): 4; .5 INJECTION, POWDER, LYOPHILIZED, FOR SOLUTION INTRAVENOUS at 05:21

## 2023-04-09 NOTE — DISCHARGE NOTE PROVIDER - NSDCCPCAREPLAN_GEN_ALL_CORE_FT
PRINCIPAL DISCHARGE DIAGNOSIS  Diagnosis: Appendicitis, acute  Assessment and Plan of Treatment: IV antibiotics given

## 2023-04-09 NOTE — DISCHARGE NOTE PROVIDER - CARE PROVIDER_API CALL
Mason Galindo)  ColonRectal Surgery; Surgery  [address], [address]  [place] [address]  Phone: [phone]  Fax: [phone]  Follow Up Time: 2 weeks

## 2023-04-09 NOTE — DISCHARGE NOTE PROVIDER - NSDCMRMEDTOKEN_GEN_ALL_CORE_FT
acetaminophen 325 mg oral tablet: 3 tab(s) orally every 6 hours As needed Severe Pain (7 - 10)  ibuprofen 400 mg oral tablet: 1 tab(s) orally every 6 hours As needed Severe Pain (7 - 10)   acetaminophen 325 mg oral tablet: 3 tab(s) orally every 6 hours As needed Severe Pain (7 - 10)  amoxicillin-clavulanate 875 mg-125 mg oral tablet: 1 tab(s) orally every 12 hours  ibuprofen 400 mg oral tablet: 1 tab(s) orally every 6 hours As needed Severe Pain (7 - 10)

## 2023-04-09 NOTE — DISCHARGE NOTE NURSING/CASE MANAGEMENT/SOCIAL WORK - PATIENT PORTAL LINK FT
You can access the FollowMyHealth Patient Portal offered by [facility] by registering at the following website: [email or web address]. By joining [employer]’s FollowMyHealth portal, you will also be able to view your health information using other applications (apps) compatible with our system.

## 2023-04-09 NOTE — DISCHARGE NOTE PROVIDER - HOSPITAL COURSE
Patient here with acute appendicitis with microperforation treates with conservative therapy, IV antibiotics. Diet was slowly advanced as tolerated and pain was under controlled, pt remained afebril and hemodynamically stable. At the time of discharge, the patient was hemodynamically stable, was tolerating PO diet, was voiding urine and passing stool.  SHe was ambulating and was comfortable with adequate pain control.  The patient was instructed to follow up with Dr. Galindo within 2 weeks and to complete a 2 week course of antibiotics at home after discharge from the hospital.  The patient / family felt comfortable with discharge.  The patient had no other issues. Per attending, patient deemed medically stable and ready for discharge at this time.

## 2023-04-13 LAB
CULTURE RESULTS: SIGNIFICANT CHANGE UP
CULTURE RESULTS: SIGNIFICANT CHANGE UP
SPECIMEN SOURCE: SIGNIFICANT CHANGE UP
SPECIMEN SOURCE: SIGNIFICANT CHANGE UP

## 2023-04-18 ENCOUNTER — INPATIENT (INPATIENT)
Facility: HOSPITAL | Age: 23
LOS: 10 days | Discharge: HOME | End: 2023-04-29
Attending: SURGERY | Admitting: SURGERY
Payer: COMMERCIAL

## 2023-04-18 VITALS
OXYGEN SATURATION: 98 % | DIASTOLIC BLOOD PRESSURE: 55 MMHG | SYSTOLIC BLOOD PRESSURE: 102 MMHG | TEMPERATURE: 100 F | HEART RATE: 100 BPM | RESPIRATION RATE: 18 BRPM | HEIGHT: 59 IN

## 2023-04-18 DIAGNOSIS — K35.33 ACUTE APPENDICITIS WITH PERFORATION, LOCALIZED PERITONITIS, AND GANGRENE, WITH ABSCESS: ICD-10-CM

## 2023-04-18 LAB
ALBUMIN SERPL ELPH-MCNC: 3.8 G/DL — SIGNIFICANT CHANGE UP (ref 3.3–5)
ALP SERPL-CCNC: 68 U/L — SIGNIFICANT CHANGE UP (ref 40–120)
ALT FLD-CCNC: 9 U/L — SIGNIFICANT CHANGE UP (ref 4–33)
ANION GAP SERPL CALC-SCNC: 10 MMOL/L — SIGNIFICANT CHANGE UP (ref 7–14)
APTT BLD: 29.7 SEC — SIGNIFICANT CHANGE UP (ref 27–36.3)
AST SERPL-CCNC: 9 U/L — SIGNIFICANT CHANGE UP (ref 4–32)
B PERT DNA SPEC QL NAA+PROBE: SIGNIFICANT CHANGE UP
B PERT+PARAPERT DNA PNL SPEC NAA+PROBE: SIGNIFICANT CHANGE UP
BASOPHILS # BLD AUTO: 0.04 K/UL — SIGNIFICANT CHANGE UP (ref 0–0.2)
BASOPHILS NFR BLD AUTO: 0.2 % — SIGNIFICANT CHANGE UP (ref 0–2)
BILIRUB SERPL-MCNC: <0.2 MG/DL — SIGNIFICANT CHANGE UP (ref 0.2–1.2)
BORDETELLA PARAPERTUSSIS (RAPRVP): SIGNIFICANT CHANGE UP
BUN SERPL-MCNC: 7 MG/DL — SIGNIFICANT CHANGE UP (ref 7–23)
C PNEUM DNA SPEC QL NAA+PROBE: SIGNIFICANT CHANGE UP
CALCIUM SERPL-MCNC: 9.6 MG/DL — SIGNIFICANT CHANGE UP (ref 8.4–10.5)
CHLORIDE SERPL-SCNC: 101 MMOL/L — SIGNIFICANT CHANGE UP (ref 98–107)
CO2 SERPL-SCNC: 23 MMOL/L — SIGNIFICANT CHANGE UP (ref 22–31)
CREAT SERPL-MCNC: 0.7 MG/DL — SIGNIFICANT CHANGE UP (ref 0.5–1.3)
CRP SERPL-MCNC: 275.3 MG/L — HIGH
EGFR: 125 ML/MIN/1.73M2 — SIGNIFICANT CHANGE UP
EOSINOPHIL # BLD AUTO: 0.05 K/UL — SIGNIFICANT CHANGE UP (ref 0–0.5)
EOSINOPHIL NFR BLD AUTO: 0.3 % — SIGNIFICANT CHANGE UP (ref 0–6)
FLUAV SUBTYP SPEC NAA+PROBE: SIGNIFICANT CHANGE UP
FLUBV RNA SPEC QL NAA+PROBE: SIGNIFICANT CHANGE UP
GLUCOSE SERPL-MCNC: 116 MG/DL — HIGH (ref 70–99)
HADV DNA SPEC QL NAA+PROBE: SIGNIFICANT CHANGE UP
HCG SERPL-ACNC: <5 MIU/ML — SIGNIFICANT CHANGE UP
HCOV 229E RNA SPEC QL NAA+PROBE: SIGNIFICANT CHANGE UP
HCOV HKU1 RNA SPEC QL NAA+PROBE: SIGNIFICANT CHANGE UP
HCOV NL63 RNA SPEC QL NAA+PROBE: SIGNIFICANT CHANGE UP
HCOV OC43 RNA SPEC QL NAA+PROBE: SIGNIFICANT CHANGE UP
HCT VFR BLD CALC: 33.4 % — LOW (ref 34.5–45)
HGB BLD-MCNC: 11.3 G/DL — LOW (ref 11.5–15.5)
HMPV RNA SPEC QL NAA+PROBE: SIGNIFICANT CHANGE UP
HPIV1 RNA SPEC QL NAA+PROBE: SIGNIFICANT CHANGE UP
HPIV2 RNA SPEC QL NAA+PROBE: SIGNIFICANT CHANGE UP
HPIV3 RNA SPEC QL NAA+PROBE: SIGNIFICANT CHANGE UP
HPIV4 RNA SPEC QL NAA+PROBE: SIGNIFICANT CHANGE UP
IANC: 13.73 K/UL — HIGH (ref 1.8–7.4)
IMM GRANULOCYTES NFR BLD AUTO: 0.3 % — SIGNIFICANT CHANGE UP (ref 0–0.9)
INR BLD: 1.22 RATIO — HIGH (ref 0.88–1.16)
LIDOCAIN IGE QN: 11 U/L — SIGNIFICANT CHANGE UP (ref 7–60)
LYMPHOCYTES # BLD AUTO: 13.9 % — SIGNIFICANT CHANGE UP (ref 13–44)
LYMPHOCYTES # BLD AUTO: 2.7 K/UL — SIGNIFICANT CHANGE UP (ref 1–3.3)
M PNEUMO DNA SPEC QL NAA+PROBE: SIGNIFICANT CHANGE UP
MCHC RBC-ENTMCNC: 30 PG — SIGNIFICANT CHANGE UP (ref 27–34)
MCHC RBC-ENTMCNC: 33.8 GM/DL — SIGNIFICANT CHANGE UP (ref 32–36)
MCV RBC AUTO: 88.6 FL — SIGNIFICANT CHANGE UP (ref 80–100)
MONOCYTES # BLD AUTO: 2.86 K/UL — HIGH (ref 0–0.9)
MONOCYTES NFR BLD AUTO: 14.7 % — HIGH (ref 2–14)
NEUTROPHILS # BLD AUTO: 13.73 K/UL — HIGH (ref 1.8–7.4)
NEUTROPHILS NFR BLD AUTO: 70.6 % — SIGNIFICANT CHANGE UP (ref 43–77)
NRBC # BLD: 0 /100 WBCS — SIGNIFICANT CHANGE UP (ref 0–0)
NRBC # FLD: 0 K/UL — SIGNIFICANT CHANGE UP (ref 0–0)
PLATELET # BLD AUTO: 380 K/UL — SIGNIFICANT CHANGE UP (ref 150–400)
POTASSIUM SERPL-MCNC: 4.2 MMOL/L — SIGNIFICANT CHANGE UP (ref 3.5–5.3)
POTASSIUM SERPL-SCNC: 4.2 MMOL/L — SIGNIFICANT CHANGE UP (ref 3.5–5.3)
PROT SERPL-MCNC: 7.8 G/DL — SIGNIFICANT CHANGE UP (ref 6–8.3)
PROTHROM AB SERPL-ACNC: 14.2 SEC — HIGH (ref 10.5–13.4)
RAPID RVP RESULT: SIGNIFICANT CHANGE UP
RBC # BLD: 3.77 M/UL — LOW (ref 3.8–5.2)
RBC # FLD: 12.1 % — SIGNIFICANT CHANGE UP (ref 10.3–14.5)
RSV RNA SPEC QL NAA+PROBE: SIGNIFICANT CHANGE UP
RV+EV RNA SPEC QL NAA+PROBE: SIGNIFICANT CHANGE UP
SARS-COV-2 RNA SPEC QL NAA+PROBE: SIGNIFICANT CHANGE UP
SODIUM SERPL-SCNC: 134 MMOL/L — LOW (ref 135–145)
WBC # BLD: 19.43 K/UL — HIGH (ref 3.8–10.5)
WBC # FLD AUTO: 19.43 K/UL — HIGH (ref 3.8–10.5)

## 2023-04-18 PROCEDURE — 99222 1ST HOSP IP/OBS MODERATE 55: CPT

## 2023-04-18 PROCEDURE — 74177 CT ABD & PELVIS W/CONTRAST: CPT | Mod: 26,MA

## 2023-04-18 PROCEDURE — 99285 EMERGENCY DEPT VISIT HI MDM: CPT

## 2023-04-18 RX ORDER — SODIUM CHLORIDE 9 MG/ML
1000 INJECTION INTRAMUSCULAR; INTRAVENOUS; SUBCUTANEOUS ONCE
Refills: 0 | Status: COMPLETED | OUTPATIENT
Start: 2023-04-18 | End: 2023-04-18

## 2023-04-18 RX ORDER — METOCLOPRAMIDE HCL 10 MG
10 TABLET ORAL ONCE
Refills: 0 | Status: COMPLETED | OUTPATIENT
Start: 2023-04-18 | End: 2023-04-18

## 2023-04-18 RX ORDER — SODIUM CHLORIDE 9 MG/ML
1000 INJECTION INTRAMUSCULAR; INTRAVENOUS; SUBCUTANEOUS
Refills: 0 | Status: DISCONTINUED | OUTPATIENT
Start: 2023-04-18 | End: 2023-04-19

## 2023-04-18 RX ORDER — PIPERACILLIN AND TAZOBACTAM 4; .5 G/20ML; G/20ML
3.38 INJECTION, POWDER, LYOPHILIZED, FOR SOLUTION INTRAVENOUS ONCE
Refills: 0 | Status: COMPLETED | OUTPATIENT
Start: 2023-04-18 | End: 2023-04-18

## 2023-04-18 RX ORDER — KETOROLAC TROMETHAMINE 30 MG/ML
15 SYRINGE (ML) INJECTION ONCE
Refills: 0 | Status: DISCONTINUED | OUTPATIENT
Start: 2023-04-18 | End: 2023-04-18

## 2023-04-18 RX ADMIN — SODIUM CHLORIDE 1000 MILLILITER(S): 9 INJECTION INTRAMUSCULAR; INTRAVENOUS; SUBCUTANEOUS at 20:51

## 2023-04-18 RX ADMIN — PIPERACILLIN AND TAZOBACTAM 200 GRAM(S): 4; .5 INJECTION, POWDER, LYOPHILIZED, FOR SOLUTION INTRAVENOUS at 23:38

## 2023-04-18 RX ADMIN — Medication 15 MILLIGRAM(S): at 20:51

## 2023-04-18 RX ADMIN — SODIUM CHLORIDE 125 MILLILITER(S): 9 INJECTION INTRAMUSCULAR; INTRAVENOUS; SUBCUTANEOUS at 23:38

## 2023-04-18 RX ADMIN — Medication 10 MILLIGRAM(S): at 20:51

## 2023-04-18 NOTE — ED ADULT NURSE NOTE - OBJECTIVE STATEMENT
Pt A&Ox4 ambulatory, no PMH, presenting to the ED (Intake 2) c/o RLQ. Pt  has been experiencing RLQ x appro 2 weeks. Pt  came to the ED about 2 weeks with same complaints,  was diagnosed with appendicitis and was dioscharged with PO antibiotics. Pt  Friday started to experience fevers. Pt  was referred from PCP to come to the ED for further evaluation. Denies any other complaints. Respirations are even and unlabored, NAD, 20G IV RAC, labs sent, rvp sent, medicated as per orders, Safety precautions implemented as per protocol, awaiting further MD orders, will continue with plan of care.

## 2023-04-18 NOTE — H&P ADULT - HISTORY OF PRESENT ILLNESS
23F pmh Hirschsprung (s/p pull through/rectoplasty) and recent hx (1 week ago) of perforated appendicitis with appendicolith and possible infection extending to fallopian tube tx presenting with IV abx presenting 1 with abdominal pain, fever to 101 and nausea for last two days. Currently on PO Augmentin course post hospitalization, set to finish on Friday.  Reports poor PO intake. Passing flatus and having BM. CT with noted worsened appendicitis with persistent appendicolith and possible 3.5 x 2.5 abscess.

## 2023-04-18 NOTE — ED PROVIDER NOTE - OBJECTIVE STATEMENT
23-year-old female with no pertinent past medical history presents with complaints of right lower quadrant abdominal pain and intermittent fevers x4 days.  Patient was diagnosed with appendicitis with microperforations on April 7, 2023 and was treated conservatively with IV antibiotics.  Patient was discharged home with Augmentin which she has been taking as prescribed but has not yet completed.  Patient reports decrease in appetite secondary to fever but denies vomiting, diarrhea.  Patient notes 1 episode of constipation that has since resolved.  Pt also c/o intermittent HA. No associated urinary symptoms, chest pain, shortness of breath, or cough.  Patient is scheduled to see Dr. Galindo, as an outpatient tomorrow.  No other voiced complaints.

## 2023-04-18 NOTE — H&P ADULT - ASSESSMENT
23F pmh Hirschsprung (s/p pull through/rectoplasty) and recent hx (1 week ago) of perforated appendicitis with appendicolith and possible infection extending to fallopian tube tx presenting with IV abx presenting 1 with abdominal pain, fever to 101 and nausea for last two days. Currently on PO Augmentin course post hospitalization, set to finish on Friday.  Reports poor PO intake. Passing flatus and having BM. CT with noted worsened appendicitis with persistent appendicolith and possible 3.5 x 2.5 abscess. Noted infection at falopian tube secondaryt o infection, cannot r/o fallopian-enteric fistula per imaging    - admit to Dr. Jeff, [facility]  - NPO/IVF  - IV abx  - IR consult  - pain control    d/w Dr. Johnson, surgery fellow on behalf of attending    A Team, [id number]

## 2023-04-18 NOTE — H&P ADULT - NSHPPHYSICALEXAM_GEN_ALL_CORE
PHYSICAL EXAM:    GENERAL: NAD, well-groomed, well-developed  NECK: Supple, No JVD, Normal thyroid  HEART: Regular rate and rhythm; No murmurs, rubs, or gallops  RESPIRATORY: CTA B/L, No W/R/R  ABDOMEN: Soft, focally tender in RLQ with involuntary guarding

## 2023-04-18 NOTE — ED PROVIDER NOTE - ATTENDING APP SHARED VISIT CONTRIBUTION OF CARE
Brief HPI:  22-year-old female no past medical history presents with right lower quadrant abdominal pain and fevers for the last 4 days.  Patient seen in ED on April 7, 2023 with CT showing appendicitis and was treated with antibiotics without surgery and discharged on Augmentin.    Vitals:   Reviewed    Exam:    GEN:  Non-toxic appearing, non-distressed, speaking full sentences, non-diaphoretic, AAOx3  HEENT:  NCAT, neck supple, EOMI, PERRLA, sclera anicteric, no conjunctival pallor or injection, no stridor, normal voice, no tonsillar exudate, uvula midline  CV:  regular rhythm and rate, s1/s2 audible, no murmurs, rubs or gallops, peripheral pulses 2+ and symmetric  PULM:  non-labored respirations, lungs clear to auscultation bilaterally, no wheezes, crackles or rales  ABD:  non distended, Right lower quadrant tenderness, no rebound, no guarding, negative Torres's sign, bowel sounds normal, no cvat  MSK:  no gross deformity, non-tender extremities and joints, range of motion grossly normal appearing, no extremity edema, extremities warm and well perfused   NEURO:  AAOx3, CN II-XII intact, motor 5/5 in upper and lower extremities bilaterally, sensation grossly intact in extremities and trunk, finger to nose testing wnl, no nystagmus, negative Romberg, no pronator drift, no gait deficit  SKIN:  warm, dry, no rash or vesicles     A/P:  22-year-old female no past medical history presents with right lower quadrant abdominal pain and fevers for the last 4 days.  Vital signs stable.  Exam nontoxic-appearing, tenderness in the right lower quadrant abdomen but nonperitoneal.  Suspect persistent appendicitis.  Will send labs, surgical consult, possible repeat CT abdomen pelvis.  Disposition pending.

## 2023-04-18 NOTE — ED PROVIDER NOTE - PHYSICAL EXAMINATION
CONSTITUTIONAL: Well-appearing; well-nourished; in no apparent distress. Non-toxic appearing.   NEURO: Alert & oriented. Gait steady without assistance. Sensory and motor functions are grossly intact.  PSYCH: Mood appropriate. Thought processes intact.   NECK: Supple  CARD: Regular rate and rhythm, no murmurs  RESP: No accessory muscle use; breath sounds clear and equal bilaterally; no wheezes, rhonchi, or rales     ABD: Soft; non-distended. (+) RLQ tenderness with voluntary guarding. No rebound. No peritoneal signs.   MUSCULOSKELETAL/EXTREMITIES: FROM in all four extremities; no extremity edema.  SKIN: Warm; dry; no apparent lesions or exudate

## 2023-04-18 NOTE — ED PROVIDER NOTE - CLINICAL SUMMARY MEDICAL DECISION MAKING FREE TEXT BOX
23-year-old female with no pertinent past medical history presents with complaints of right lower quadrant abdominal pain and intermittent fevers x4 days.  Patient was diagnosed with appendicitis with microperforations on April 7, 2023 and was treated conservatively with IV antibiotics.  Patient was discharged home with Augmentin which she has been taking as prescribed but has not yet completed.  Patient reports decrease in appetite secondary to fever but denies vomiting, diarrhea.  Patient notes 1 episode of constipation that has since resolved.  Also, c/o intermittent HA. No associated urinary symptoms, chest pain, shortness of breath, or cough.  Patient is scheduled to see Dr. Galindo, as an outpatient tomorrow.  No other voiced complaints.  VSS. Exam as noted above. Plan to assess labs (including preop), surgery eval, +/- CT A/P given exam without signs of peritonitis, no rebound. CT ordered for placement purposes. Dispo pending.

## 2023-04-18 NOTE — ED ADULT TRIAGE NOTE - CHIEF COMPLAINT QUOTE
p/t seen and diagnosed with appendicitis one week ago, been taking antibiotics, sent by PMD for fever for 3 days, p/t denies any nausea or vomiting

## 2023-04-18 NOTE — H&P ADULT - NSHPLABSRESULTS_GEN_ALL_CORE
< from: CT Abdomen and Pelvis w/ IV Cont (04.18.23 @ 21:53) >      BOWEL: No bowel obstruction. The appendix is enlarged and thick-walled   measuring up to 1.2 cm. A 0.5 cm appendicolith is noted (2, 78).   Increased periappendiceal inflammatory stranding is noted. Portion of the   appendix appears discontinuous, suspicious for perforation. Ill-defined   thick-walled air-fluid collection in the region of the appendiceal tip   measures 2.6 x 3.5 cm. This may be related to an adjacent loop of distal   ileum, though this is also in the region of previously noted small   abscess, and suspected to represent interval increase in size of   previously noted periappendiceal abscess. Several foci of air lateral to   this collection are of uncertain location.  PERITONEUM: Trace ascites.  VESSELS: Within normal limits. Circumaortic left renal vein.  RETROPERITONEUM/LYMPH NODES: Stable prominent right lower quadrant lymph   node measuring up to 1 cm in short axis dimension.  ABDOMINAL WALL: Within normal limits.  BONES: Within normal limits.    IMPRESSION:  Interval worsened appearance of appendicitis, with suspected interval   increase in size of previously described small periappendiceal abscess.   Additional foci of air are noted adjacent to this and are not   definitively within bowel lumen. There is also discontinuity of the   appendiceal wall, and overall findings are compatible with sequelae of   contained perforation.    Tubular air containing structure in the right adnexa is again noted. This   is nonspecific and may represent sequelae of infection, though may be   related to the fallopian tube with salpingoenteric fistula.      < end of copied text >

## 2023-04-18 NOTE — ED ADULT NURSE REASSESSMENT NOTE - NS ED NURSE REASSESS COMMENT FT1
Pt is A&O x 4, ambulatory w/o assistance, shows no signs of acute distress. VSS. Respirations even a unlabored. To be admitted for appendicitis. Anti-bx tx and IVF initiated. Pending bed assignment.

## 2023-04-18 NOTE — ED PROVIDER NOTE - NS ED ROS FT
ROS:  GENERAL: As per HPI   CARDIAC: no chest pain  PULMONARY: no cough, no shortness of breath  GI: As per HPI  (+) constipation  : No dysuria, no frequency, no change in appearance or odor of urine  SKIN: no rashes  NEURO: (+) headache, no weakness, no dizziness  MSK: No joint pain  All other systems reviewed as negative.

## 2023-04-19 ENCOUNTER — APPOINTMENT (OUTPATIENT)
Dept: COLORECTAL SURGERY | Facility: CLINIC | Age: 23
End: 2023-04-19

## 2023-04-19 ENCOUNTER — TRANSCRIPTION ENCOUNTER (OUTPATIENT)
Age: 23
End: 2023-04-19

## 2023-04-19 LAB
ANION GAP SERPL CALC-SCNC: 11 MMOL/L — SIGNIFICANT CHANGE UP (ref 7–14)
BUN SERPL-MCNC: 6 MG/DL — LOW (ref 7–23)
CALCIUM SERPL-MCNC: 8.5 MG/DL — SIGNIFICANT CHANGE UP (ref 8.4–10.5)
CHLORIDE SERPL-SCNC: 104 MMOL/L — SIGNIFICANT CHANGE UP (ref 98–107)
CO2 SERPL-SCNC: 21 MMOL/L — LOW (ref 22–31)
CREAT SERPL-MCNC: 0.64 MG/DL — SIGNIFICANT CHANGE UP (ref 0.5–1.3)
EGFR: 127 ML/MIN/1.73M2 — SIGNIFICANT CHANGE UP
GLUCOSE SERPL-MCNC: 89 MG/DL — SIGNIFICANT CHANGE UP (ref 70–99)
HCT VFR BLD CALC: 30.7 % — LOW (ref 34.5–45)
HGB BLD-MCNC: 9.9 G/DL — LOW (ref 11.5–15.5)
MAGNESIUM SERPL-MCNC: 1.9 MG/DL — SIGNIFICANT CHANGE UP (ref 1.6–2.6)
MCHC RBC-ENTMCNC: 28.7 PG — SIGNIFICANT CHANGE UP (ref 27–34)
MCHC RBC-ENTMCNC: 32.2 GM/DL — SIGNIFICANT CHANGE UP (ref 32–36)
MCV RBC AUTO: 89 FL — SIGNIFICANT CHANGE UP (ref 80–100)
NRBC # BLD: 0 /100 WBCS — SIGNIFICANT CHANGE UP (ref 0–0)
NRBC # FLD: 0 K/UL — SIGNIFICANT CHANGE UP (ref 0–0)
PHOSPHATE SERPL-MCNC: 2.7 MG/DL — SIGNIFICANT CHANGE UP (ref 2.5–4.5)
PLATELET # BLD AUTO: 370 K/UL — SIGNIFICANT CHANGE UP (ref 150–400)
POTASSIUM SERPL-MCNC: 4.7 MMOL/L — SIGNIFICANT CHANGE UP (ref 3.5–5.3)
POTASSIUM SERPL-SCNC: 4.7 MMOL/L — SIGNIFICANT CHANGE UP (ref 3.5–5.3)
RBC # BLD: 3.45 M/UL — LOW (ref 3.8–5.2)
RBC # FLD: 12.1 % — SIGNIFICANT CHANGE UP (ref 10.3–14.5)
SODIUM SERPL-SCNC: 136 MMOL/L — SIGNIFICANT CHANGE UP (ref 135–145)
WBC # BLD: 17.17 K/UL — HIGH (ref 3.8–10.5)
WBC # FLD AUTO: 17.17 K/UL — HIGH (ref 3.8–10.5)

## 2023-04-19 PROCEDURE — 99232 SBSQ HOSP IP/OBS MODERATE 35: CPT

## 2023-04-19 RX ORDER — ACETAMINOPHEN 500 MG
1000 TABLET ORAL ONCE
Refills: 0 | Status: COMPLETED | OUTPATIENT
Start: 2023-04-19 | End: 2023-04-19

## 2023-04-19 RX ORDER — HEPARIN SODIUM 5000 [USP'U]/ML
5000 INJECTION INTRAVENOUS; SUBCUTANEOUS EVERY 8 HOURS
Refills: 0 | Status: DISCONTINUED | OUTPATIENT
Start: 2023-04-19 | End: 2023-04-24

## 2023-04-19 RX ORDER — PIPERACILLIN AND TAZOBACTAM 4; .5 G/20ML; G/20ML
3.38 INJECTION, POWDER, LYOPHILIZED, FOR SOLUTION INTRAVENOUS EVERY 8 HOURS
Refills: 0 | Status: COMPLETED | OUTPATIENT
Start: 2023-04-19 | End: 2023-04-26

## 2023-04-19 RX ORDER — KETOROLAC TROMETHAMINE 30 MG/ML
15 SYRINGE (ML) INJECTION ONCE
Refills: 0 | Status: DISCONTINUED | OUTPATIENT
Start: 2023-04-19 | End: 2023-04-19

## 2023-04-19 RX ORDER — PIPERACILLIN AND TAZOBACTAM 4; .5 G/20ML; G/20ML
3.38 INJECTION, POWDER, LYOPHILIZED, FOR SOLUTION INTRAVENOUS ONCE
Refills: 0 | Status: COMPLETED | OUTPATIENT
Start: 2023-04-19 | End: 2023-04-19

## 2023-04-19 RX ORDER — SODIUM CHLORIDE 9 MG/ML
1000 INJECTION, SOLUTION INTRAVENOUS
Refills: 0 | Status: DISCONTINUED | OUTPATIENT
Start: 2023-04-19 | End: 2023-04-21

## 2023-04-19 RX ORDER — PIPERACILLIN AND TAZOBACTAM 4; .5 G/20ML; G/20ML
3.38 INJECTION, POWDER, LYOPHILIZED, FOR SOLUTION INTRAVENOUS ONCE
Refills: 0 | Status: DISCONTINUED | OUTPATIENT
Start: 2023-04-19 | End: 2023-04-19

## 2023-04-19 RX ADMIN — PIPERACILLIN AND TAZOBACTAM 25 GRAM(S): 4; .5 INJECTION, POWDER, LYOPHILIZED, FOR SOLUTION INTRAVENOUS at 21:18

## 2023-04-19 RX ADMIN — PIPERACILLIN AND TAZOBACTAM 25 GRAM(S): 4; .5 INJECTION, POWDER, LYOPHILIZED, FOR SOLUTION INTRAVENOUS at 05:03

## 2023-04-19 RX ADMIN — Medication 1000 MILLIGRAM(S): at 21:48

## 2023-04-19 RX ADMIN — Medication 400 MILLIGRAM(S): at 05:17

## 2023-04-19 RX ADMIN — Medication 1000 MILLIGRAM(S): at 12:12

## 2023-04-19 RX ADMIN — Medication 1000 MILLIGRAM(S): at 06:17

## 2023-04-19 RX ADMIN — HEPARIN SODIUM 5000 UNIT(S): 5000 INJECTION INTRAVENOUS; SUBCUTANEOUS at 14:10

## 2023-04-19 RX ADMIN — SODIUM CHLORIDE 100 MILLILITER(S): 9 INJECTION, SOLUTION INTRAVENOUS at 15:27

## 2023-04-19 RX ADMIN — HEPARIN SODIUM 5000 UNIT(S): 5000 INJECTION INTRAVENOUS; SUBCUTANEOUS at 21:18

## 2023-04-19 RX ADMIN — Medication 400 MILLIGRAM(S): at 21:18

## 2023-04-19 RX ADMIN — SODIUM CHLORIDE 100 MILLILITER(S): 9 INJECTION, SOLUTION INTRAVENOUS at 01:01

## 2023-04-19 RX ADMIN — PIPERACILLIN AND TAZOBACTAM 25 GRAM(S): 4; .5 INJECTION, POWDER, LYOPHILIZED, FOR SOLUTION INTRAVENOUS at 14:09

## 2023-04-19 RX ADMIN — HEPARIN SODIUM 5000 UNIT(S): 5000 INJECTION INTRAVENOUS; SUBCUTANEOUS at 05:20

## 2023-04-19 RX ADMIN — Medication 15 MILLIGRAM(S): at 15:18

## 2023-04-19 RX ADMIN — Medication 15 MILLIGRAM(S): at 15:33

## 2023-04-19 RX ADMIN — Medication 400 MILLIGRAM(S): at 11:58

## 2023-04-19 NOTE — PROGRESS NOTE ADULT - SUBJECTIVE AND OBJECTIVE BOX
[facility] Surgery Daily Progress Note  =====================================================    SUBJECTIVE: Patient seen and examined at bedside on AM rounds. Patient reports that they're feeling OK. NPO/Tolerating diet, denies nausea, vomiting.    Flatus/    BM. OOB/Amublating as tolerated. Denies fever, chills.    --------------------------------------------------------------------------------------  OBJECTIVE:    VITAL SIGNS:  Vital Signs Last 24 Hrs  T(C): 36.7 (19 Apr 2023 06:17), Max: 38.5 (19 Apr 2023 03:41)  T(F): 98.1 (19 Apr 2023 06:17), Max: 101.3 (19 Apr 2023 03:41)  HR: 94 (19 Apr 2023 03:41) (87 - 100)  BP: 117/64 (19 Apr 2023 03:41) (100/58 - 117/64)  BP(mean): --  RR: 17 (19 Apr 2023 03:41) (16 - 18)  SpO2: 100% (19 Apr 2023 03:41) (98% - 100%)    Parameters below as of 19 Apr 2023 03:41  Patient On (Oxygen Delivery Method): room air      --------------------------------------------------------------------------------------    EXAM:  General: NAD, resting in bed comfortably.  Cardiac: regular rate, warm and well perfused  Respiratory: Nonlabored respirations, normal cw expansion.  Abdomen: Soft, minimally tender in RLQ, nondistended    --------------------------------------------------------------------------------------    LABS:                        9.9    17.17 )-----------( 370      ( 19 Apr 2023 04:11 )             30.7     04-19    136  |  104  |  6<L>  ----------------------------<  89  4.7   |  21<L>  |  0.64    Ca    8.5      19 Apr 2023 04:11  Phos  2.7     04-19  Mg     1.90     04-19    TPro  7.8  /  Alb  3.8  /  TBili  <0.2  /  DBili  x   /  AST  9   /  ALT  9   /  AlkPhos  68  04-18    PT/INR - ( 18 Apr 2023 20:48 )   PT: 14.2 sec;   INR: 1.22 ratio         PTT - ( 18 Apr 2023 20:48 )  PTT:29.7 sec      --------------------------------------------------------------------------------------    INS AND OUTS:      --------------------------------------------------------------------------------------    MEDICATIONS:  MEDICATIONS  (STANDING):  heparin   Injectable 5000 Unit(s) SubCutaneous every 8 hours  lactated ringers. 1000 milliLiter(s) (100 mL/Hr) IV Continuous <Continuous>  piperacillin/tazobactam IVPB.. 3.375 Gram(s) IV Intermittent every 8 hours    MEDICATIONS  (PRN):    --------------------------------------------------------------------------------------   [facility] Surgery Daily Progress Note  =====================================================    SUBJECTIVE: Patient seen and examined at bedside on AM rounds. Patient reports that they're feeling OK. NPO, denies nausea, vomiting. States she is having fevers    --------------------------------------------------------------------------------------  OBJECTIVE:    VITAL SIGNS:  Vital Signs Last 24 Hrs  T(C): 36.7 (19 Apr 2023 06:17), Max: 38.5 (19 Apr 2023 03:41)  T(F): 98.1 (19 Apr 2023 06:17), Max: 101.3 (19 Apr 2023 03:41)  HR: 94 (19 Apr 2023 03:41) (87 - 100)  BP: 117/64 (19 Apr 2023 03:41) (100/58 - 117/64)  BP(mean): --  RR: 17 (19 Apr 2023 03:41) (16 - 18)  SpO2: 100% (19 Apr 2023 03:41) (98% - 100%)    Parameters below as of 19 Apr 2023 03:41  Patient On (Oxygen Delivery Method): room air      --------------------------------------------------------------------------------------    EXAM:  General: NAD, resting in bed comfortably.  Cardiac: regular rate, warm and well perfused  Respiratory: Nonlabored respirations, normal cw expansion.  Abdomen: Soft, minimally tender in RLQ, nondistended    --------------------------------------------------------------------------------------    LABS:                        9.9    17.17 )-----------( 370      ( 19 Apr 2023 04:11 )             30.7     04-19    136  |  104  |  6<L>  ----------------------------<  89  4.7   |  21<L>  |  0.64    Ca    8.5      19 Apr 2023 04:11  Phos  2.7     04-19  Mg     1.90     04-19    TPro  7.8  /  Alb  3.8  /  TBili  <0.2  /  DBili  x   /  AST  9   /  ALT  9   /  AlkPhos  68  04-18    PT/INR - ( 18 Apr 2023 20:48 )   PT: 14.2 sec;   INR: 1.22 ratio         PTT - ( 18 Apr 2023 20:48 )  PTT:29.7 sec      --------------------------------------------------------------------------------------    INS AND OUTS:      --------------------------------------------------------------------------------------    MEDICATIONS:  MEDICATIONS  (STANDING):  heparin   Injectable 5000 Unit(s) SubCutaneous every 8 hours  lactated ringers. 1000 milliLiter(s) (100 mL/Hr) IV Continuous <Continuous>  piperacillin/tazobactam IVPB.. 3.375 Gram(s) IV Intermittent every 8 hours    MEDICATIONS  (PRN):    --------------------------------------------------------------------------------------

## 2023-04-19 NOTE — PATIENT PROFILE ADULT - FALL HARM RISK - HARM RISK INTERVENTIONS

## 2023-04-19 NOTE — CONSULT NOTE ADULT - SUBJECTIVE AND OBJECTIVE BOX
Vascular & Interventional Radiology    HPI: 23y Female with Hirschsprung (s/p pull through/rectoplasty) and recent hx (1 week ago) of perforated appendicitis with appendicolith and possible infection extending to fallopian tube tx presenting with IV abx presenting 1 with abdominal pain, fever to 101 and nausea for last two days. Currently on PO Augmentin course post hospitalization, set to finish on Friday.  Reports poor PO intake. Passing flatus and having BM. CT with noted worsened appendicitis with persistent appendicolith and possible 3.5 x 2.5 abscess. Noted infection at falopian tube secondary to infection.    Allergies: No Known Allergies    Data:  T(C): 36.4  HR: 98  BP: 111/63  RR: 17  SpO2: 100%    -WBC 17.17 / HgB 9.9 / Hct 30.7 / Plt 370  -Na 136 / Cl 104 / BUN 6 / Glucose 89  -K 4.7 / CO2 21 / Cr 0.64  -INR1.22    Assessment:   23y Female w/ perforated appendicitis p/w abdominal pain, fever, and nausea. Repeat CT w/ interval enlargement of a periappendiceal abscess.    Plan:   - No safe percutaneous window for drain placement.  - Management per surgery.  - Page as needed.      --  Terry Pinzon MD, PGY-6  Vascular and Interventional Radiology  Available on [employer] Teams    - Nonemergent consults:  [facility] order "Consult- Interventional Radiology"  - Emergent issues (pager): [facility] [phone]; [facility] [phone]; [phone]  - Scheduling questions: [facility] [phone]; [facility] [phone]  - Clinic/outpatient booking: [facility] [phone]; [facility] [phone]  Vascular & Interventional Radiology    HPI: 23y Female with Hirschsprung (s/p pull through/rectoplasty) and recent hx (1 week ago) of perforated appendicitis with appendicolith and possible infection extending to fallopian tube tx presenting with IV abx presenting 1 with abdominal pain, fever to 101 and nausea for last two days. Currently on PO Augmentin course post hospitalization, set to finish on Friday.  Reports poor PO intake. Passing flatus and having BM. CT with noted worsened appendicitis with persistent appendicolith and possible 3.5 x 2.5 abscess. Noted infection at falopian tube secondary to infection.    Allergies: No Known Allergies    Data:  T(C): 36.4  HR: 98  BP: 111/63  RR: 17  SpO2: 100%    -WBC 17.17 / HgB 9.9 / Hct 30.7 / Plt 370  -Na 136 / Cl 104 / BUN 6 / Glucose 89  -K 4.7 / CO2 21 / Cr 0.64  -INR1.22    Assessment:   23y Female w/ perforated appendicitis p/w abdominal pain, fever, and nausea. Repeat CT w/ interval enlargement of a periappendiceal abscess.    Plan:   - No definitively safe percutaneous window for drain placement.  - Management per surgery.  - Page as needed.      --  Terry Pinzon MD, PGY-6  Vascular and Interventional Radiology  Available on [employer] Teams    - Nonemergent consults:  [facility] order "Consult- Interventional Radiology"  - Emergent issues (pager): [facility] [phone]; [facility] [phone]; [phone]  - Scheduling questions: [facility] [phone]; [facility] [phone]  - Clinic/outpatient booking: [facility] [phone]; [facility] [phone]

## 2023-04-19 NOTE — PROGRESS NOTE ADULT - ASSESSMENT
23F pmh Hirschsprung (s/p pull through/rectoplasty) and recent hx (1 week ago) of perforated appendicitis with appendicolith and possible infection extending to fallopian tube tx presenting with IV abx presenting 1 with abdominal pain, fever to 101 and nausea for last two days. Currently on PO Augmentin course post hospitalization, set to finish on Friday.  Reports poor PO intake. Passing flatus and having BM. CT with noted worsened appendicitis with persistent appendicolith and possible 3.5 x 2.5 abscess. Noted infection at falopian tube secondary to infection.    PLAN:  - NPO/IVF  - IV ABX: Zosyn  - IR Consult for drainage of abscess  - Pain Control    [facility] Team Surgery  [id number]

## 2023-04-20 ENCOUNTER — RESULT REVIEW (OUTPATIENT)
Age: 23
End: 2023-04-20

## 2023-04-20 ENCOUNTER — TRANSCRIPTION ENCOUNTER (OUTPATIENT)
Age: 23
End: 2023-04-20

## 2023-04-20 LAB
ANION GAP SERPL CALC-SCNC: 12 MMOL/L — SIGNIFICANT CHANGE UP (ref 7–14)
APTT BLD: 31 SEC — SIGNIFICANT CHANGE UP (ref 27–36.3)
BLD GP AB SCN SERPL QL: NEGATIVE — SIGNIFICANT CHANGE UP
BUN SERPL-MCNC: 4 MG/DL — LOW (ref 7–23)
CALCIUM SERPL-MCNC: 9.2 MG/DL — SIGNIFICANT CHANGE UP (ref 8.4–10.5)
CHLORIDE SERPL-SCNC: 102 MMOL/L — SIGNIFICANT CHANGE UP (ref 98–107)
CO2 SERPL-SCNC: 25 MMOL/L — SIGNIFICANT CHANGE UP (ref 22–31)
CREAT SERPL-MCNC: 0.77 MG/DL — SIGNIFICANT CHANGE UP (ref 0.5–1.3)
EGFR: 111 ML/MIN/1.73M2 — SIGNIFICANT CHANGE UP
GLUCOSE SERPL-MCNC: 99 MG/DL — SIGNIFICANT CHANGE UP (ref 70–99)
HCT VFR BLD CALC: 31.9 % — LOW (ref 34.5–45)
HCT VFR BLD CALC: 35.3 % — SIGNIFICANT CHANGE UP (ref 34.5–45)
HGB BLD-MCNC: 10.6 G/DL — LOW (ref 11.5–15.5)
HGB BLD-MCNC: 11.4 G/DL — LOW (ref 11.5–15.5)
INR BLD: 1.21 RATIO — HIGH (ref 0.88–1.16)
MAGNESIUM SERPL-MCNC: 2 MG/DL — SIGNIFICANT CHANGE UP (ref 1.6–2.6)
MCHC RBC-ENTMCNC: 29.8 PG — SIGNIFICANT CHANGE UP (ref 27–34)
MCHC RBC-ENTMCNC: 29.9 PG — SIGNIFICANT CHANGE UP (ref 27–34)
MCHC RBC-ENTMCNC: 32.3 GM/DL — SIGNIFICANT CHANGE UP (ref 32–36)
MCHC RBC-ENTMCNC: 33.2 GM/DL — SIGNIFICANT CHANGE UP (ref 32–36)
MCV RBC AUTO: 89.9 FL — SIGNIFICANT CHANGE UP (ref 80–100)
MCV RBC AUTO: 92.4 FL — SIGNIFICANT CHANGE UP (ref 80–100)
NRBC # BLD: 0 /100 WBCS — SIGNIFICANT CHANGE UP (ref 0–0)
NRBC # BLD: 0 /100 WBCS — SIGNIFICANT CHANGE UP (ref 0–0)
NRBC # FLD: 0 K/UL — SIGNIFICANT CHANGE UP (ref 0–0)
NRBC # FLD: 0 K/UL — SIGNIFICANT CHANGE UP (ref 0–0)
PHOSPHATE SERPL-MCNC: 3.6 MG/DL — SIGNIFICANT CHANGE UP (ref 2.5–4.5)
PLATELET # BLD AUTO: 380 K/UL — SIGNIFICANT CHANGE UP (ref 150–400)
PLATELET # BLD AUTO: 391 K/UL — SIGNIFICANT CHANGE UP (ref 150–400)
POTASSIUM SERPL-MCNC: 3.9 MMOL/L — SIGNIFICANT CHANGE UP (ref 3.5–5.3)
POTASSIUM SERPL-SCNC: 3.9 MMOL/L — SIGNIFICANT CHANGE UP (ref 3.5–5.3)
PROTHROM AB SERPL-ACNC: 14.1 SEC — HIGH (ref 10.5–13.4)
RBC # BLD: 3.55 M/UL — LOW (ref 3.8–5.2)
RBC # BLD: 3.82 M/UL — SIGNIFICANT CHANGE UP (ref 3.8–5.2)
RBC # FLD: 12.1 % — SIGNIFICANT CHANGE UP (ref 10.3–14.5)
RBC # FLD: 12.2 % — SIGNIFICANT CHANGE UP (ref 10.3–14.5)
RH IG SCN BLD-IMP: POSITIVE — SIGNIFICANT CHANGE UP
SODIUM SERPL-SCNC: 139 MMOL/L — SIGNIFICANT CHANGE UP (ref 135–145)
WBC # BLD: 16.28 K/UL — HIGH (ref 3.8–10.5)
WBC # BLD: 19.79 K/UL — HIGH (ref 3.8–10.5)
WBC # FLD AUTO: 16.28 K/UL — HIGH (ref 3.8–10.5)
WBC # FLD AUTO: 19.79 K/UL — HIGH (ref 3.8–10.5)

## 2023-04-20 PROCEDURE — 88304 TISSUE EXAM BY PATHOLOGIST: CPT | Mod: 26

## 2023-04-20 PROCEDURE — 44970 LAPAROSCOPY APPENDECTOMY: CPT | Mod: 22

## 2023-04-20 PROCEDURE — 99233 SBSQ HOSP IP/OBS HIGH 50: CPT | Mod: 57

## 2023-04-20 PROCEDURE — 45020 DRAINAGE OF RECTAL ABSCESS: CPT | Mod: 22

## 2023-04-20 PROCEDURE — 44140 PARTIAL REMOVAL OF COLON: CPT | Mod: 22

## 2023-04-20 DEVICE — STAPLER COVIDIEN TRI-STAPLE 60MM PURPLE RELOAD: Type: IMPLANTABLE DEVICE | Status: FUNCTIONAL

## 2023-04-20 DEVICE — STAPLER COVIDIEN TRI-STAPLE 60MM BLACK RELOAD: Type: IMPLANTABLE DEVICE | Status: FUNCTIONAL

## 2023-04-20 RX ORDER — OXYCODONE HYDROCHLORIDE 5 MG/1
5 TABLET ORAL EVERY 4 HOURS
Refills: 0 | Status: DISCONTINUED | OUTPATIENT
Start: 2023-04-20 | End: 2023-04-26

## 2023-04-20 RX ORDER — HYDROMORPHONE HYDROCHLORIDE 2 MG/ML
0.25 INJECTION INTRAMUSCULAR; INTRAVENOUS; SUBCUTANEOUS EVERY 6 HOURS
Refills: 0 | Status: DISCONTINUED | OUTPATIENT
Start: 2023-04-20 | End: 2023-04-21

## 2023-04-20 RX ORDER — FENTANYL CITRATE 50 UG/ML
50 INJECTION INTRAVENOUS
Refills: 0 | Status: DISCONTINUED | OUTPATIENT
Start: 2023-04-20 | End: 2023-04-20

## 2023-04-20 RX ORDER — ACETAMINOPHEN 500 MG
1000 TABLET ORAL EVERY 6 HOURS
Refills: 0 | Status: DISCONTINUED | OUTPATIENT
Start: 2023-04-20 | End: 2023-04-21

## 2023-04-20 RX ORDER — ACETAMINOPHEN 500 MG
1000 TABLET ORAL ONCE
Refills: 0 | Status: COMPLETED | OUTPATIENT
Start: 2023-04-20 | End: 2023-04-20

## 2023-04-20 RX ORDER — ONDANSETRON 8 MG/1
4 TABLET, FILM COATED ORAL ONCE
Refills: 0 | Status: COMPLETED | OUTPATIENT
Start: 2023-04-20 | End: 2023-04-20

## 2023-04-20 RX ORDER — OXYCODONE HYDROCHLORIDE 5 MG/1
2.5 TABLET ORAL EVERY 4 HOURS
Refills: 0 | Status: DISCONTINUED | OUTPATIENT
Start: 2023-04-20 | End: 2023-04-26

## 2023-04-20 RX ORDER — HYDROMORPHONE HYDROCHLORIDE 2 MG/ML
0.5 INJECTION INTRAMUSCULAR; INTRAVENOUS; SUBCUTANEOUS
Refills: 0 | Status: DISCONTINUED | OUTPATIENT
Start: 2023-04-20 | End: 2023-04-20

## 2023-04-20 RX ADMIN — Medication 400 MILLIGRAM(S): at 18:00

## 2023-04-20 RX ADMIN — OXYCODONE HYDROCHLORIDE 2.5 MILLIGRAM(S): 5 TABLET ORAL at 20:42

## 2023-04-20 RX ADMIN — HEPARIN SODIUM 5000 UNIT(S): 5000 INJECTION INTRAVENOUS; SUBCUTANEOUS at 15:16

## 2023-04-20 RX ADMIN — Medication 400 MILLIGRAM(S): at 23:41

## 2023-04-20 RX ADMIN — ONDANSETRON 4 MILLIGRAM(S): 8 TABLET, FILM COATED ORAL at 15:09

## 2023-04-20 RX ADMIN — Medication 1000 MILLIGRAM(S): at 18:30

## 2023-04-20 RX ADMIN — HYDROMORPHONE HYDROCHLORIDE 0.5 MILLIGRAM(S): 2 INJECTION INTRAMUSCULAR; INTRAVENOUS; SUBCUTANEOUS at 15:09

## 2023-04-20 RX ADMIN — PIPERACILLIN AND TAZOBACTAM 25 GRAM(S): 4; .5 INJECTION, POWDER, LYOPHILIZED, FOR SOLUTION INTRAVENOUS at 05:47

## 2023-04-20 RX ADMIN — HEPARIN SODIUM 5000 UNIT(S): 5000 INJECTION INTRAVENOUS; SUBCUTANEOUS at 22:49

## 2023-04-20 RX ADMIN — PIPERACILLIN AND TAZOBACTAM 25 GRAM(S): 4; .5 INJECTION, POWDER, LYOPHILIZED, FOR SOLUTION INTRAVENOUS at 14:53

## 2023-04-20 RX ADMIN — HEPARIN SODIUM 5000 UNIT(S): 5000 INJECTION INTRAVENOUS; SUBCUTANEOUS at 05:48

## 2023-04-20 RX ADMIN — Medication 1000 MILLIGRAM(S): at 04:48

## 2023-04-20 RX ADMIN — PIPERACILLIN AND TAZOBACTAM 25 GRAM(S): 4; .5 INJECTION, POWDER, LYOPHILIZED, FOR SOLUTION INTRAVENOUS at 22:49

## 2023-04-20 RX ADMIN — Medication 400 MILLIGRAM(S): at 04:18

## 2023-04-20 RX ADMIN — HYDROMORPHONE HYDROCHLORIDE 0.5 MILLIGRAM(S): 2 INJECTION INTRAMUSCULAR; INTRAVENOUS; SUBCUTANEOUS at 15:30

## 2023-04-20 RX ADMIN — OXYCODONE HYDROCHLORIDE 2.5 MILLIGRAM(S): 5 TABLET ORAL at 21:02

## 2023-04-20 NOTE — PRE-OP CHECKLIST - VERIFY SURGICAL SITE/SIDE WITH PATIENT
appy and ight ovary and right fallopian tube ght/done appy and right ovary and right fallopian tube/done

## 2023-04-20 NOTE — CONSULT NOTE ADULT - SUBJECTIVE AND OBJECTIVE BOX
Gyn Consult Note  PAUL PIKE  23y  Female [id number]    HPI: 23F pmh Hirschsprung (s/p pull through/rectoplasty) and recent hx (1 week ago) of perforated appendicitis with appendicolith and possible infection extending to fallopian tube tx presenting with IV abx presenting 1 with abdominal pain, fever to 101 and nausea for last two days. Currently on PO Augmentin course post hospitalization, set to finish on Friday.  Reports poor PO intake. Passing flatus and having BM. CT with noted worsened appendicitis with persistent appendicolith and possible 3.5 x 2.5 abscess.  (18 Apr 2023 22:57)    Name of GYN Physician: Dr. Jose Daniel Barth    OBHx:  GXPX  GYNHx: Denies fibroids, cysts, endometriosis, STI's, Abnormal pap smears   PMHx:  PSHx: Exlap, Pull through for Hirschprung  Meds:   Allergies: NKDA      Vital Signs Last 24 Hrs  T(C): 38.1 (20 Apr 2023 09:46), Max: 39.5 (20 Apr 2023 09:46)  T(F): 100.6 (20 Apr 2023 09:46), Max: 103.1 (20 Apr 2023 09:46)  HR: 110 (20 Apr 2023 09:46) (63 - 121)  BP: 118/75 (20 Apr 2023 09:46) (100/60 - 123/80)  BP(mean): --  RR: 18 (20 Apr 2023 09:46) (16 - 20)  SpO2: 100% (20 Apr 2023 09:46) (99% - 100%)    Parameters below as of 20 Apr 2023 06:00  Patient On (Oxygen Delivery Method): room air      Physical Exam:   General: sitting comfortably in bed, NAD   CV: RRR  Lungs: CTAB  Back: No CVA tenderness  Abd: Soft, non-tender, non-distended.  Bowel sounds present.    : No bleeding on pad. External labia wnl. Uterus wnl, nontender. Adnexa non palpable b/l. No CMT. Cervix closed.   Speculum Exam: No active bleeding from os.  Physiologic discharge.    Ext: non-tender b/l, no edema     LABS:                        11.4   19.79 )-----------( 391      ( 20 Apr 2023 08:52 )             35.3     04-20    139  |  102  |  4<L>  ----------------------------<  99  3.9   |  25  |  0.77    Ca    9.2      20 Apr 2023 00:30  Phos  3.6     04-20  Mg     2.00     04-20    TPro  7.8  /  Alb  3.8  /  TBili  <0.2  /  DBili  x   /  AST  9   /  ALT  9   /  AlkPhos  68  04-18    I&O's Detail  19 Apr 2023 07:01  -  20 Apr 2023 07:00  --------------------------------------------------------  IN:    IV PiggyBack: 100 mL    Lactated Ringers: 2000 mL    Oral Fluid: 400 mL  Total IN: 2500 mL  OUT:    Blood Loss (mL): 0 mL  Total OUT: 0 mL  Total NET: 2500 mL      PT/INR - ( 20 Apr 2023 00:30 )   PT: 14.1 sec;   INR: 1.21 ratio       PTT - ( 20 Apr 2023 00:30 )  PTT:31.0 sec      RADIOLOGY & ADDITIONAL STUDIES:

## 2023-04-20 NOTE — CONSULT NOTE ADULT - ASSESSMENT
22yo admitted to general surgery with ruptured appendicitis without improved, with possible involvement of right adnexa.   - GYN available introp for poss intervention  - Consents signed for EUA, poss right salpingectomy, poss right oophorectomy, all other indicated procedures  - GYN to follow closely     Seen and consented with Dr. Felicity Hogue PGY3 24yo admitted to general surgery with ruptured appendicitis without improved, with possible involvement of right adnexa.   - GYN available intraop for poss intervention  - Consents signed for EUA, poss right salpingectomy, poss right oophorectomy, all other indicated procedures  - GYN to follow closely     Seen and consented with Dr. Felicity Hogue PGYGyn attending  Agree with plan of care. Rev with pt and mom poss salonpingectomy/oophorectomy on the right pending intraop evaluation of complex abscess mass   Extensive rev concerns.Ample time for questions provided and answered to their satisfaction at this time.Mom has a USO at age !$ and had 3 kids so comfortable with medical necessity for RSO

## 2023-04-20 NOTE — DISCHARGE NOTE PROVIDER - NSDCCPCAREPLAN_GEN_ALL_CORE_FT
PRINCIPAL DISCHARGE DIAGNOSIS  Diagnosis: Appendicitis with abscess  Assessment and Plan of Treatment:

## 2023-04-20 NOTE — BRIEF OPERATIVE NOTE - NSICDXBRIEFPROCEDURE_GEN_ALL_CORE_FT
PROCEDURES:  Laparoscopic appendectomy 20-Apr-2023 14:55:32  Jeff Duque  Laparoscopic cecectomy 20-Apr-2023 14:55:40  Jeff Duque

## 2023-04-20 NOTE — PROGRESS NOTE ADULT - TIME BILLING
evaluation, treatment planning, discussion of surgery/potential complications, and coordination of care

## 2023-04-20 NOTE — DISCHARGE NOTE PROVIDER - NSDCFUADDINST_GEN_ALL_CORE_FT
WOUND CARE:  Please keep incisions clean and dry. Please do not Scrub or rub incisions. Do not use lotion or powder on incisions.   BATHING: You may shower and/or sponge bathe. You may use warm soapy water in the shower and rinse, pat dry.  ACTIVITY: No heavy lifting or straining. Otherwise, you may return to your usual level of physical activity. If you are taking narcotic pain medication DO NOT drive a car, operate machinery or make important decisions.  DIET: Return to your usual diet.  NOTIFY YOUR SURGEON IF YOU HAVE: any bleeding that does not stop, any pus draining from your wound(s), any fever (over 100.4 F) persistent nausea/vomiting, or if your pain is not controlled on your discharge pain medications, unable to urinate.  Please follow up with your primary care physician in one week regarding your hospitalization, bring copies of your discharge paperwork.  Please follow up with your surgeon, Dr. Galindo WOUND CARE:  Please keep incisions clean and dry. Please do not Scrub or rub incisions. Do not use lotion or powder on incisions.   BATHING: You may shower and/or sponge bathe. You may use warm soapy water in the shower and rinse, pat dry.  ACTIVITY: No heavy lifting or straining. Otherwise, you may return to your usual level of physical activity. If you are taking narcotic pain medication DO NOT drive a car, operate machinery or make important decisions.  DIET: Return to your usual diet.  NOTIFY YOUR SURGEON IF YOU HAVE: any bleeding that does not stop, any pus draining from your wound(s), any fever (over 100.4 F) persistent nausea/vomiting, or if your pain is not controlled on your discharge pain medications, unable to urinate.  Please follow up with your primary care physician in one week regarding your hospitalization, bring copies of your discharge paperwork.  Please follow up with your surgeon, Dr. Galindo within a week; we'll set up an outpatient CT at your scan

## 2023-04-20 NOTE — DISCHARGE NOTE PROVIDER - CARE PROVIDER_API CALL
Mason Galindo)  ColonRectal Surgery; Surgery  [address] [facility] [address], [address]  [place] [address]  Phone: [phone]  Fax: [phone]  Follow Up Time: 1 week

## 2023-04-20 NOTE — BRIEF OPERATIVE NOTE - OPERATION/FINDINGS
laparoscopic appendectomy and partial cecectomy  - appendix severely inflamed and perforated, surrounded by dense adhesions and abscess cavity. Cavity was entered with francisco pus suctioned out  - terminal ileum dissected off of the abscess cavity and perforated appendix, base was identified and circumferentially dissected out  - base of the appendix was inflamed and involved within the abscess cavity so decision made to staple across the cecum, using 60mm purple load Endo MOMO x 2  - R ureter identified and preserved  - No pelvic organs were noted to be involved  - Fascia closed w/ 0 vicryl, skin with 4-0 monocryl, 19Fr Syd drain placed in pelvis and around the staple line

## 2023-04-20 NOTE — DISCHARGE NOTE PROVIDER - NSDCDCMDCOMP_GEN_ALL_CORE
"NICU Progress Note    Lenin Hernandez                     Baby's First Name =   Phyllis    YOB: 2023 Gender: male   At Birth: Gestational Age: 32w4d BW: 6 lb 1 oz (2750 g)   Age today :  11 days Obstetrician: MARCELA [place]      Corrected GA: 34w1d           OVERVIEW     Baby delivered at Gestational Age: 32w4d by   due to complete placenta previa with bleeding.    Admitted to the NICU for RDS and prematurity.          MATERNAL / PREGNANCY / L&D INFORMATION     REFER TO NICU ADMISSION NOTE           INFORMATION     Vital Signs Temp:  [98.6 °F (37 °C)-99.4 °F (37.4 °C)] 99.2 °F (37.3 °C)  Pulse:  [156-184] 168  Resp:  [56-64] 64  BP: (84-96)/(34-41) 96/34  SpO2 Percentage    23 1100 23 1200 23 1300   SpO2: 98% 98% 97%          Birth Length: (inches)  Current Length:    Height: 47 cm (18.5\")     Birth OFC:   Current OFC: Head Circumference: 13.78\" (35 cm)  Head Circumference: 13.39\" (34 cm)     Birth Weight:                                              2750 g (6 lb 1 oz)  Current Weight: Weight: 2679 g (5 lb 14.5 oz)   Weight change from Birth Weight: -3%           PHYSICAL EXAMINATION     General appearance Quiet and responsive. LGA appearing.    Skin  No rashes or petechiae.    Mild jaundice. Well perfused.     HEENT: AFSF.  Opti flow cannula and NGT secure.    Chest Breath sounds clear bilaterally   Mild tachypnea and retractions   Heart  Normal rate and rhythm.    No murmur.  Normal pulses.    Abdomen + BS.  Soft, non-tender.  No mass/HSM.     Genitalia  Normal  male.  Patent anus.   Trunk and Spine Spine normal and intact.     Extremities  Moving extremities equally   Neuro Normal tone and activity.           LABORATORY AND RADIOLOGY RESULTS     No results found for this or any previous visit (from the past 24 hour(s)).    I have reviewed the most recent lab results and radiology imaging results. The pertinent findings are reviewed in the Diagnosis/Daily " Assessment/Plan of Treatment.          MEDICATIONS     Scheduled Meds:caffeine citrate, 10 mg/kg/day (Order-Specific), Oral, Daily  pediatric multivitamin, 1 mL, Oral, Daily  similac probiotic tri-blend, 1 packet, Oral, Daily    Continuous Infusions:   PRN Meds:.  Glucose    hepatitis B vaccine (recombinant)            DIAGNOSES / DAILY ASSESSMENT / PLAN OF TREATMENT            ACTIVE DIAGNOSES   ___________________________________________________________     Infant Gestational Age: 32w4d at birth    HISTORY:   Gestational Age: 32w4d at birth  male; Vertex  , Low Transverse;   Corrected GA: 34w1d    BED TYPE: open crib     PLAN:   Continue care in NICU  Circumcision prior to discharge if parents desire  ___________________________________________________________    NUTRITIONAL SUPPORT  LARGE FOR GESTATIONAL AGE (LGA)    HISTORY:  Mother plans to Breastfeed  BW: 6 lb 1 oz (2750 g)  Birth Measurements (Martell Chart): Wt 99%ile, Length 92%ile, HC >99%ile.  Return to BW (DOL):      PROCEDURES:   UVC 8/10 - 8/15  UAC 8/10 -     DAILY ASSESSMENT:  Today's Weight: 2679 g (5 lb 14.5 oz)     Weight change: 41 g (1.5 oz)     Weight change from BW:  -3%    Growth chart reviewed on :  Weight 83%, Length 80%, and HC 97%.  Gained 4 grams/kg/day over 5 days (-).     Tolerating feeds with EBM/DBM w/ HMF 1:25, currently @ 55 mL for  mL/kg/day  Minimal PO (4%)  Void/Stool WNL    Intake & Output (last day)          0701   0700  0701   0700    P.O. 18 20    NG/ 90    Total Intake(mL/kg) 435 (162.37) 110 (41.06)    Net +435 +110          Urine Unmeasured Occurrence 8 x 2 x    Stool Unmeasured Occurrence 6 x 2 x    Emesis Unmeasured Occurrence 2 x           PLAN:  Continue feeds of EBM/DBM w/ HMF 1:25  Probiotics (Triblend) as meet criteria of <33 weeks GA  Monitor daily weights/weekly growth curve.  RD/SLP consulted.   Continue MVI/Fe 1mL  daily  ___________________________________________________________    Respiratory Distress Syndrome    HISTORY:  Respiratory distress soon after birth treated with CPAP  Admission CXR: consistent with RDS  Admission CB.16/69/-6    RESPIRATORY SUPPORT HISTORY:   BCPAP 8/10 - 8/10  SIMV 8/10 -   BCPAP  -   HFNC  -     PROCEDURES:   Intubation for surfactant administration (8/10).  Intubation and continued vent support     DAILY ASSESSMENT:  Current Respiratory Support: HFNC 1.5L/21%  Mild tachypnea on exam, RR in 60s  No events     PLAN:  Continue HFNC  1.5L  Monitor FiO2/WOB/sats   __________________________________________________________    AT RISK FOR RSV    HISTORY:  Follow 2018 NPA Guidelines As Follows:  32 1/7 - 35 6/7 weeks may qualify for Synagis if less than 6 months at start of RSV season and significant risk factors identified    PLAN:  Provide Synagis during RSV season if significant risk factors noted - per PCP.  ___________________________________________________________    APNEA/BRADYCARDIA/DESATURATIONS    HISTORY:  No apnea events or caffeine to date.  No recent events    PLAN:  Cardio-respiratory monitoring  Continue caffeine and monitor for events  ___________________________________________________________    ABNORMAL  METABOLIC SCREEN     HISTORY:  [facility]  Screen sent on 2023:  Abnormal for:general elevation of one or more amino acids with no indication or pattern of a specific metabolic defect. Finding most likely due to TPN administration.  All Else Normal.  Repeat  screen collected : Pending    PLAN:  Follow up repeat  screen, collected   ___________________________________________________________    SOCIAL/PARENTAL SUPPORT    HISTORY:  Social history:  Maternal UDS positive for THC on 23  FOB Involved.  Cordstat sent on admission: negative  MSW saw on : offered support and resources with no concerns     PLAN:  Parental  support as indicated  ___________________________________________________________          RESOLVED DIAGNOSES   ___________________________________________________________    OBSERVATION FOR SEPSIS    HISTORY:  Notable history/risk factors:    Maternal GBS Culture:  Not Tested  ROM was 0h 00m .  Admission CBC/diff:   WBC 4, 2% bands, 27% Neutrophils.  Admission Blood culture obtained (placenta) - Final NG   Ampicillin/Gentamicin started given worsened clinical status requiring intubation.  Completed 36 hours abx on .    CBC:  WBC 14, 71% Neutrophils, no bands.  CRP <0.3.  ___________________________________________________________    SCREENING FOR CONGENITAL CMV INFECTION    HISTORY:  Notable Prenatal Hx, Ultrasound, and/or lab findings:  None  CMV testing sent per NICU routine: Not detected  ___________________________________________________________    JAUNDICE     HISTORY:  MBT=  O-  BBT/GABE =  O +/-  Peak T bili 10.8 on   Last T bili 4.6 on     PHOTOTHERAPY:    Double PT:  - 8/15  Single PT: 8/15-  ___________________________________________________________                                                               DISCHARGE PLANNING           HEALTHCARE MAINTENANCE     CCHD     Car Seat Challenge Test      Hearing Screen     [place] Screen  Abnormal; See Above Dx Abnormal State Screen      Vitamin K  phytonadione (VITAMIN K) injection 1 mg first administered on 2023  6:49 PM    Erythromycin Eye Ointment  erythromycin (ROMYCIN) ophthalmic ointment first administered on 2023  7:15 PM          IMMUNIZATIONS     PLAN:  HBV at 30 days of age for first in series (9/10).    ADMINISTERED:  There is no immunization history for the selected administration types on file for this patient.          FOLLOW UP APPOINTMENTS     1) PCP Name: Dr. Delong            PENDING TEST  RESULTS  AT THE TIME OF DISCHARGE           PARENT UPDATES      At the time of admission, the  parents were updated by SANDRA Sawyer. Update included infant's condition and plan of treatment. Parent questions were addressed.  Parental consent for NICU admission and treatment was obtained.    8/11  Dr Rush updated MOB by phone.  Discussed overall status, vent and FiO2 requirements, feedings and antibiotics.  Questions answered.  8/12  Dr Rush updated parents by phone.  Discussed doing well on CPAP, advancing feeds and finishing abx.  Questions answered.  8/14: SANDRA Durham updated parents at bedside regarding infant's status and plan of care. All questions addressed.   8/16: Dr. Adams updated MOB at bedside with plan of care.  All questions addressed.  8/17: SANDRA Lion updated parents at the bedside with plan of care for today. All questions answered.   8/19: SANDRA Alejandre attempted to update parents via phone. No answer; will update if they return call.   8/20: SANDRA Durham updated parents at bedside regarding infant's status and plan of care. All questions addressed.           ATTESTATION      Intensive cardiac and respiratory monitoring, continuous and/or frequent vital sign monitoring in NICU is indicated.    Irma Zamora MD  2023  13:24 EDT    This document is complete and the patient is ready for discharge.

## 2023-04-20 NOTE — PROGRESS NOTE ADULT - SUBJECTIVE AND OBJECTIVE BOX
TEAM A Surgery Daily Progress Note  =====================================================    SUBJECTIVE: Patient seen and examined at bedside on AM rounds. Patient reports that they're feeling no better than yesterday. NPO for possible OR, denies nausea, vomiting. Reports having fevers overnight    --------------------------------------------------------------------------------------  OBJECTIVE:    VITAL SIGNS:  Vital Signs Last 24 Hrs  T(C): 36.9 (20 Apr 2023 06:00), Max: 38.7 (19 Apr 2023 22:00)  T(F): 98.5 (20 Apr 2023 06:00), Max: 101.7 (19 Apr 2023 22:00)  HR: 63 (20 Apr 2023 06:00) (63 - 110)  BP: 100/60 (20 Apr 2023 06:00) (100/60 - 123/80)  BP(mean): --  RR: 18 (20 Apr 2023 06:00) (16 - 18)  SpO2: 100% (20 Apr 2023 06:00) (99% - 100%)    Parameters below as of 20 Apr 2023 06:00  Patient On (Oxygen Delivery Method): room air      --------------------------------------------------------------------------------------    EXAM:  General: NAD, resting in bed comfortably.  Cardiac: regular rate, warm and well perfused  Respiratory: Nonlabored respirations, normal cw expansion.  Abdomen: Soft, minimally tender in RLQ, nondistended    --------------------------------------------------------------------------------------    LABS:                        10.6   16.28 )-----------( 380      ( 20 Apr 2023 00:30 )             31.9     04-20    139  |  102  |  4<L>  ----------------------------<  99  3.9   |  25  |  0.77    Ca    9.2      20 Apr 2023 00:30  Phos  3.6     04-20  Mg     2.00     04-20    TPro  7.8  /  Alb  3.8  /  TBili  <0.2  /  DBili  x   /  AST  9   /  ALT  9   /  AlkPhos  68  04-18    PT/INR - ( 20 Apr 2023 00:30 )   PT: 14.1 sec;   INR: 1.21 ratio         PTT - ( 20 Apr 2023 00:30 )  PTT:31.0 sec      --------------------------------------------------------------------------------------    INS AND OUTS:    04-19-23 @ 07:01  -  04-20-23 @ 07:00  --------------------------------------------------------  IN: 2500 mL / OUT: 0 mL / NET: 2500 mL        --------------------------------------------------------------------------------------    MEDICATIONS:  MEDICATIONS  (STANDING):  heparin   Injectable 5000 Unit(s) SubCutaneous every 8 hours  lactated ringers. 1000 milliLiter(s) (100 mL/Hr) IV Continuous <Continuous>  piperacillin/tazobactam IVPB.. 3.375 Gram(s) IV Intermittent every 8 hours    MEDICATIONS  (PRN):    --------------------------------------------------------------------------------------

## 2023-04-20 NOTE — PROGRESS NOTE ADULT - ASSESSMENT
23F pmh Hirschsprung (s/p pull through/rectoplasty) and recent hx (1 week ago) of perforated appendicitis with appendicolith and possible infection extending to fallopian tube tx presenting with IV abx presenting 1 with abdominal pain, fever to 101 and nausea for last two days. Currently on PO Augmentin course post hospitalization, set to finish on Friday.  Reports poor PO intake. Passing flatus and having BM. CT with noted worsened appendicitis with persistent appendicolith and possible 3.5 x 2.5 abscess. Noted infection at falopian tube secondary to infection.    PLAN:  - NPO/IVF  - IV ABX: Zosyn  - Pain Control  - IR unable to drain collection  - Added on/consented for OR today if no improvement with IV abx      A Team Surgery  [id number]

## 2023-04-20 NOTE — DISCHARGE NOTE PROVIDER - HOSPITAL COURSE
23 year old female readmitted for perforated appendicitis. IR consulted for potential drainage, deferred secondary to lack of window. 4/20 Patient went to the OR for ****    Patient tolerated operation well and there were no post-operative complications identified. Patient remained hemodynamically stable in the PACU and transferred to the surgical floor. Diet was restarted and advanced as tolerated. Pain control was transitioned from IV to PO pain meds. At this time, patient is currently ambulating, voiding, tolerating a regular diet. Pain well controlled on PO pain meds. Patient has been deemed stable for discharge home with follow up as an outpatient. 23 year old female readmitted for perforated appendicitis. IR consulted for potential drainage, deferred secondary to lack of window. 4/20 Patient went to the OR for laparoscopic appendectomy with partial cecectomy.    4/25 CT scan demonstrated ...    Patient tolerated operation well and there were no post-operative complications identified. Patient remained hemodynamically stable in the PACU and transferred to the surgical floor. Diet was restarted and advanced as tolerated. Pain control was transitioned from IV to PO pain meds. At this time, patient is currently ambulating, voiding, tolerating a regular diet. Pain well controlled on PO pain meds. Patient has been deemed stable for discharge home with follow up as an outpatient. 23 year old female readmitted for perforated appendicitis. IR consulted for potential drainage, deferred secondary to lack of window. 4/20 Patient went to the OR for laparoscopic appendectomy with partial cecectomy.    4/21-4/24: Tachycardic and multiple fevers; kept patient on fluids and IV abx; gave multiple boluses  4/25: CT scan demonstrated loculated fluid collections inn the RLQ.  4/26: IR aspirated 4cc of fluid from RLQ  4/27: Patient started on regular diet  4/29: Cultures returned no growth    At the time of discharge, the patient was hemodynamically stable, was tolerating PO diet, was voiding urine and passing stool, was ambulating, and was comfortable with adequate pain control. The patient was instructed to follow up with Dr. Galindo within 1 week after discharge from the hospital. The patient/family felt comfortable with discharge. The patient was discharged to home/rehab. The patient had no other issues.

## 2023-04-21 LAB
ANION GAP SERPL CALC-SCNC: 14 MMOL/L — SIGNIFICANT CHANGE UP (ref 7–14)
BUN SERPL-MCNC: 4 MG/DL — LOW (ref 7–23)
CALCIUM SERPL-MCNC: 8.8 MG/DL — SIGNIFICANT CHANGE UP (ref 8.4–10.5)
CHLORIDE SERPL-SCNC: 99 MMOL/L — SIGNIFICANT CHANGE UP (ref 98–107)
CO2 SERPL-SCNC: 21 MMOL/L — LOW (ref 22–31)
CREAT SERPL-MCNC: 0.71 MG/DL — SIGNIFICANT CHANGE UP (ref 0.5–1.3)
EGFR: 122 ML/MIN/1.73M2 — SIGNIFICANT CHANGE UP
GLUCOSE SERPL-MCNC: 99 MG/DL — SIGNIFICANT CHANGE UP (ref 70–99)
HCT VFR BLD CALC: 30.3 % — LOW (ref 34.5–45)
HGB BLD-MCNC: 10 G/DL — LOW (ref 11.5–15.5)
MAGNESIUM SERPL-MCNC: 1.6 MG/DL — SIGNIFICANT CHANGE UP (ref 1.6–2.6)
MCHC RBC-ENTMCNC: 29.4 PG — SIGNIFICANT CHANGE UP (ref 27–34)
MCHC RBC-ENTMCNC: 33 GM/DL — SIGNIFICANT CHANGE UP (ref 32–36)
MCV RBC AUTO: 89.1 FL — SIGNIFICANT CHANGE UP (ref 80–100)
NRBC # BLD: 0 /100 WBCS — SIGNIFICANT CHANGE UP (ref 0–0)
NRBC # FLD: 0 K/UL — SIGNIFICANT CHANGE UP (ref 0–0)
PHOSPHATE SERPL-MCNC: 2.3 MG/DL — LOW (ref 2.5–4.5)
PLATELET # BLD AUTO: 404 K/UL — HIGH (ref 150–400)
POTASSIUM SERPL-MCNC: 3.8 MMOL/L — SIGNIFICANT CHANGE UP (ref 3.5–5.3)
POTASSIUM SERPL-SCNC: 3.8 MMOL/L — SIGNIFICANT CHANGE UP (ref 3.5–5.3)
RBC # BLD: 3.4 M/UL — LOW (ref 3.8–5.2)
RBC # FLD: 12.3 % — SIGNIFICANT CHANGE UP (ref 10.3–14.5)
SODIUM SERPL-SCNC: 134 MMOL/L — LOW (ref 135–145)
WBC # BLD: 17.16 K/UL — HIGH (ref 3.8–10.5)
WBC # FLD AUTO: 17.16 K/UL — HIGH (ref 3.8–10.5)

## 2023-04-21 RX ORDER — SODIUM CHLORIDE 9 MG/ML
1000 INJECTION, SOLUTION INTRAVENOUS ONCE
Refills: 0 | Status: COMPLETED | OUTPATIENT
Start: 2023-04-21 | End: 2023-04-21

## 2023-04-21 RX ORDER — MAGNESIUM SULFATE 500 MG/ML
2 VIAL (ML) INJECTION ONCE
Refills: 0 | Status: COMPLETED | OUTPATIENT
Start: 2023-04-21 | End: 2023-04-21

## 2023-04-21 RX ORDER — SODIUM CHLORIDE 9 MG/ML
1000 INJECTION, SOLUTION INTRAVENOUS
Refills: 0 | Status: DISCONTINUED | OUTPATIENT
Start: 2023-04-21 | End: 2023-04-23

## 2023-04-21 RX ORDER — ONDANSETRON 8 MG/1
4 TABLET, FILM COATED ORAL ONCE
Refills: 0 | Status: COMPLETED | OUTPATIENT
Start: 2023-04-21 | End: 2023-04-21

## 2023-04-21 RX ORDER — ACETAMINOPHEN 500 MG
1000 TABLET ORAL EVERY 6 HOURS
Refills: 0 | Status: COMPLETED | OUTPATIENT
Start: 2023-04-21 | End: 2023-04-22

## 2023-04-21 RX ORDER — IBUPROFEN 200 MG
400 TABLET ORAL EVERY 6 HOURS
Refills: 0 | Status: DISCONTINUED | OUTPATIENT
Start: 2023-04-21 | End: 2023-04-29

## 2023-04-21 RX ORDER — SODIUM,POTASSIUM PHOSPHATES 278-250MG
2 POWDER IN PACKET (EA) ORAL ONCE
Refills: 0 | Status: COMPLETED | OUTPATIENT
Start: 2023-04-21 | End: 2023-04-21

## 2023-04-21 RX ADMIN — Medication 400 MILLIGRAM(S): at 12:20

## 2023-04-21 RX ADMIN — Medication 400 MILLIGRAM(S): at 14:43

## 2023-04-21 RX ADMIN — Medication 25 GRAM(S): at 09:08

## 2023-04-21 RX ADMIN — Medication 1000 MILLIGRAM(S): at 18:34

## 2023-04-21 RX ADMIN — Medication 400 MILLIGRAM(S): at 18:18

## 2023-04-21 RX ADMIN — SODIUM CHLORIDE 1000 MILLILITER(S): 9 INJECTION, SOLUTION INTRAVENOUS at 17:06

## 2023-04-21 RX ADMIN — PIPERACILLIN AND TAZOBACTAM 25 GRAM(S): 4; .5 INJECTION, POWDER, LYOPHILIZED, FOR SOLUTION INTRAVENOUS at 21:40

## 2023-04-21 RX ADMIN — Medication 1000 MILLIGRAM(S): at 00:11

## 2023-04-21 RX ADMIN — HEPARIN SODIUM 5000 UNIT(S): 5000 INJECTION INTRAVENOUS; SUBCUTANEOUS at 21:41

## 2023-04-21 RX ADMIN — Medication 1000 MILLIGRAM(S): at 06:24

## 2023-04-21 RX ADMIN — HEPARIN SODIUM 5000 UNIT(S): 5000 INJECTION INTRAVENOUS; SUBCUTANEOUS at 14:42

## 2023-04-21 RX ADMIN — SODIUM CHLORIDE 1000 MILLILITER(S): 9 INJECTION, SOLUTION INTRAVENOUS at 12:20

## 2023-04-21 RX ADMIN — Medication 2 TABLET(S): at 09:08

## 2023-04-21 RX ADMIN — OXYCODONE HYDROCHLORIDE 5 MILLIGRAM(S): 5 TABLET ORAL at 03:55

## 2023-04-21 RX ADMIN — ONDANSETRON 4 MILLIGRAM(S): 8 TABLET, FILM COATED ORAL at 23:29

## 2023-04-21 RX ADMIN — HEPARIN SODIUM 5000 UNIT(S): 5000 INJECTION INTRAVENOUS; SUBCUTANEOUS at 05:48

## 2023-04-21 RX ADMIN — OXYCODONE HYDROCHLORIDE 5 MILLIGRAM(S): 5 TABLET ORAL at 03:25

## 2023-04-21 RX ADMIN — OXYCODONE HYDROCHLORIDE 5 MILLIGRAM(S): 5 TABLET ORAL at 09:50

## 2023-04-21 RX ADMIN — PIPERACILLIN AND TAZOBACTAM 25 GRAM(S): 4; .5 INJECTION, POWDER, LYOPHILIZED, FOR SOLUTION INTRAVENOUS at 05:47

## 2023-04-21 RX ADMIN — OXYCODONE HYDROCHLORIDE 5 MILLIGRAM(S): 5 TABLET ORAL at 09:20

## 2023-04-21 RX ADMIN — PIPERACILLIN AND TAZOBACTAM 25 GRAM(S): 4; .5 INJECTION, POWDER, LYOPHILIZED, FOR SOLUTION INTRAVENOUS at 14:43

## 2023-04-21 RX ADMIN — Medication 1000 MILLIGRAM(S): at 12:35

## 2023-04-21 RX ADMIN — Medication 400 MILLIGRAM(S): at 05:47

## 2023-04-21 RX ADMIN — Medication 400 MILLIGRAM(S): at 15:13

## 2023-04-21 NOTE — PROGRESS NOTE ADULT - SUBJECTIVE AND OBJECTIVE BOX
TEAM [ A ] Surgery Daily Progress Note  =====================================================    SUBJECTIVE: Overnight, patient was febrile to Tmax 102.5. Patient seen and examined at bedside on AM rounds. Patient reports that they're feeling well. She was able to tolerate clear liquids without nausea or vomiting. No flatus or BM. Denies chest pain, SOB    ALLERGIES:  No Known Allergies      --------------------------------------------------------------------------------------    MEDICATIONS:    Neurologic Medications  acetaminophen   IVPB .. 1000 milliGRAM(s) IV Intermittent every 6 hours  oxyCODONE    IR 2.5 milliGRAM(s) Oral every 4 hours PRN Moderate Pain (4 - 6)  oxyCODONE    IR 5 milliGRAM(s) Oral every 4 hours PRN Severe Pain (7 - 10)    Respiratory Medications    Cardiovascular Medications    Gastrointestinal Medications  lactated ringers. 1000 milliLiter(s) IV Continuous <Continuous>    Genitourinary Medications    Hematologic/Oncologic Medications  heparin   Injectable 5000 Unit(s) SubCutaneous every 8 hours    Antimicrobial/Immunologic Medications  piperacillin/tazobactam IVPB.. 3.375 Gram(s) IV Intermittent every 8 hours    Endocrine/Metabolic Medications    Topical/Other Medications    --------------------------------------------------------------------------------------    VITAL SIGNS:  ICU Vital Signs Last 24 Hrs  T(C): 39.2 (21 Apr 2023 06:00), Max: 39.5 (20 Apr 2023 09:46)  T(F): 102.5 (21 Apr 2023 06:00), Max: 103.1 (20 Apr 2023 09:46)  HR: 118 (21 Apr 2023 06:00) (64 - 121)  BP: 104/54 (21 Apr 2023 06:00) (97/46 - 123/75)  BP(mean): 74 (20 Apr 2023 16:15) (63 - 74)  ABP: --  ABP(mean): --  RR: 18 (21 Apr 2023 06:00) (15 - 20)  SpO2: 95% (21 Apr 2023 06:00) (95% - 100%)    O2 Parameters below as of 21 Apr 2023 06:00  Patient On (Oxygen Delivery Method): room air  --------------------------------------------------------------------------------------    EXAM    General: NAD, resting in bed comfortably.  Cardiac: regular rate, warm and well perfused  Respiratory: Nonlabored respirations, normal cw expansion.  Abdomen: soft, diffuse TTP,  nondistended, port sites c/d/i, REMI x 1 SS  Extremities: normal strength, FROM, no deformities    --------------------------------------------------------------------------------------    LABS                        10.0   17.16 )-----------( 404      ( 21 Apr 2023 06:20 )             30.3   04-20    139  |  102  |  4<L>  ----------------------------<  99  3.9   |  25  |  0.77    Ca    9.2      20 Apr 2023 00:30  Phos  3.6     04-20  Mg     2.00     04-20  --------------------------------------------------------------------------------------    INS AND OUTS:  I&O's Detail    20 Apr 2023 07:01  -  21 Apr 2023 07:00  --------------------------------------------------------  IN:    Lactated Ringers: 1600 mL    Oral Fluid: 790 mL  Total IN: 2390 mL    OUT:    Blood Loss (mL): 5 mL    Drain (mL): 50 mL    Voided (mL): 350 mL  Total OUT: 405 mL    Total NET: 1985 mL        --------------------------------------------------------------------------------------

## 2023-04-21 NOTE — PROGRESS NOTE ADULT - SUBJECTIVE AND OBJECTIVE BOX
ANESTHESIA POSTOP CHECK    23y Female POSTOP DAY 1 S/P     [ X] NO APPARENT ANESTHESIA COMPLICATIONS      Comments:

## 2023-04-21 NOTE — DIETITIAN INITIAL EVALUATION ADULT - OTHER INFO
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 4/20 S/p laparoscopic cholecystectomy and partial cecectomy, per chart.   Patient reports usual body weight 120lbs, consistent with current weight 54.8kg/120.8lbs(4/20/2023, per RN flow sheet). Patient reports starting to have poor po intake ~6days ago. Patient has no known food allergies. Reports currently tolerating clear liquid diet. No reports of any difficulty chewing/swallowing at this time. Patient denies any nausea, vomiting during visit. Reports passing gas, no bowel movement. Patient is amendable to Ensure Clear for nutrient support.

## 2023-04-21 NOTE — DIETITIAN INITIAL EVALUATION ADULT - PERTINENT LABORATORY DATA
04-21    134<L>  |  99  |  4<L>  ----------------------------<  99  3.8   |  21<L>  |  0.71    Ca    8.8      21 Apr 2023 06:20  Phos  2.3     04-21  Mg     1.60     04-21

## 2023-04-21 NOTE — DIETITIAN INITIAL EVALUATION ADULT - PERTINENT MEDS FT
MEDICATIONS  (STANDING):  acetaminophen   IVPB .. 1000 milliGRAM(s) IV Intermittent every 6 hours  dextrose 5% + sodium chloride 0.45% 1000 milliLiter(s) (100 mL/Hr) IV Continuous <Continuous>  heparin   Injectable 5000 Unit(s) SubCutaneous every 8 hours  piperacillin/tazobactam IVPB.. 3.375 Gram(s) IV Intermittent every 8 hours    MEDICATIONS  (PRN):  ibuprofen  Tablet. 400 milliGRAM(s) Oral every 6 hours PRN Temp greater or equal to 38C (100.4F), Mild Pain (1 - 3)  oxyCODONE    IR 2.5 milliGRAM(s) Oral every 4 hours PRN Moderate Pain (4 - 6)  oxyCODONE    IR 5 milliGRAM(s) Oral every 4 hours PRN Severe Pain (7 - 10)

## 2023-04-21 NOTE — PROGRESS NOTE ADULT - ASSESSMENT
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 4/20 S/p laparoscopic cholecystectomy and partial cecectomy    PLAN:  - Diet: clear liquids  - IVF  - Pain control  - Zosyn  - Monitor fever curve, REMI drain output  - DVT prophylaxis    A Team Surgery  [id number]   23 year old female with PMH Hirshsprung s/p pull through and rectoplasty readmitted for perforated appendicitis. 4/20 S/p laparoscopic appendectomy and partial cecectomy    PLAN:  - Diet: clear liquids  - IVF  - Pain control  - Zosyn  - Monitor fever curve, REMI drain output  - DVT prophylaxis    A Team Surgery  [id number]

## 2023-04-21 NOTE — DIETITIAN INITIAL EVALUATION ADULT - NSFNSPHYEXAMSKINFT_GEN_A_CORE
As per RN flow sheet, patient has surgical incision to abdomen, no pressure injury noted at this time.

## 2023-04-21 NOTE — DIETITIAN INITIAL EVALUATION ADULT - ADD RECOMMEND
1. Advance po diet as tolerated.   2. Recommend adding Ensure Clear 8oz 3x/day (720kcal, 24gm protein) for nutrient support.   3. Monitor weight, labs, po intake and tolerance, bowel movement, skin integrity.   4. RD remains available, consult nutrition services if needed.

## 2023-04-22 LAB
ANION GAP SERPL CALC-SCNC: 7 MMOL/L — SIGNIFICANT CHANGE UP (ref 7–14)
APPEARANCE UR: CLEAR — SIGNIFICANT CHANGE UP
BILIRUB UR-MCNC: NEGATIVE — SIGNIFICANT CHANGE UP
BUN SERPL-MCNC: 3 MG/DL — LOW (ref 7–23)
CALCIUM SERPL-MCNC: 8.4 MG/DL — SIGNIFICANT CHANGE UP (ref 8.4–10.5)
CHLORIDE SERPL-SCNC: 102 MMOL/L — SIGNIFICANT CHANGE UP (ref 98–107)
CO2 SERPL-SCNC: 25 MMOL/L — SIGNIFICANT CHANGE UP (ref 22–31)
COLOR SPEC: COLORLESS — SIGNIFICANT CHANGE UP
CREAT SERPL-MCNC: 0.73 MG/DL — SIGNIFICANT CHANGE UP (ref 0.5–1.3)
DIFF PNL FLD: ABNORMAL
EGFR: 118 ML/MIN/1.73M2 — SIGNIFICANT CHANGE UP
GLUCOSE SERPL-MCNC: 147 MG/DL — HIGH (ref 70–99)
GLUCOSE UR QL: NEGATIVE — SIGNIFICANT CHANGE UP
HCT VFR BLD CALC: 27.3 % — LOW (ref 34.5–45)
HGB BLD-MCNC: 9 G/DL — LOW (ref 11.5–15.5)
KETONES UR-MCNC: NEGATIVE — SIGNIFICANT CHANGE UP
LEUKOCYTE ESTERASE UR-ACNC: NEGATIVE — SIGNIFICANT CHANGE UP
MAGNESIUM SERPL-MCNC: 2.3 MG/DL — SIGNIFICANT CHANGE UP (ref 1.6–2.6)
MCHC RBC-ENTMCNC: 29.8 PG — SIGNIFICANT CHANGE UP (ref 27–34)
MCHC RBC-ENTMCNC: 33 GM/DL — SIGNIFICANT CHANGE UP (ref 32–36)
MCV RBC AUTO: 90.4 FL — SIGNIFICANT CHANGE UP (ref 80–100)
NITRITE UR-MCNC: NEGATIVE — SIGNIFICANT CHANGE UP
NRBC # BLD: 0 /100 WBCS — SIGNIFICANT CHANGE UP (ref 0–0)
NRBC # FLD: 0 K/UL — SIGNIFICANT CHANGE UP (ref 0–0)
PH UR: 7 — SIGNIFICANT CHANGE UP (ref 5–8)
PHOSPHATE SERPL-MCNC: 2.1 MG/DL — LOW (ref 2.5–4.5)
PLATELET # BLD AUTO: 403 K/UL — HIGH (ref 150–400)
POTASSIUM SERPL-MCNC: 3.5 MMOL/L — SIGNIFICANT CHANGE UP (ref 3.5–5.3)
POTASSIUM SERPL-SCNC: 3.5 MMOL/L — SIGNIFICANT CHANGE UP (ref 3.5–5.3)
PROT UR-MCNC: NEGATIVE — SIGNIFICANT CHANGE UP
RBC # BLD: 3.02 M/UL — LOW (ref 3.8–5.2)
RBC # FLD: 12.7 % — SIGNIFICANT CHANGE UP (ref 10.3–14.5)
SODIUM SERPL-SCNC: 134 MMOL/L — LOW (ref 135–145)
SP GR SPEC: 1 — LOW (ref 1.01–1.05)
UROBILINOGEN FLD QL: SIGNIFICANT CHANGE UP
WBC # BLD: 19.55 K/UL — HIGH (ref 3.8–10.5)
WBC # FLD AUTO: 19.55 K/UL — HIGH (ref 3.8–10.5)

## 2023-04-22 PROCEDURE — 71045 X-RAY EXAM CHEST 1 VIEW: CPT | Mod: 26

## 2023-04-22 RX ORDER — ACETAMINOPHEN 500 MG
1000 TABLET ORAL ONCE
Refills: 0 | Status: COMPLETED | OUTPATIENT
Start: 2023-04-22 | End: 2023-04-22

## 2023-04-22 RX ORDER — MAGNESIUM SULFATE 500 MG/ML
2 VIAL (ML) INJECTION ONCE
Refills: 0 | Status: COMPLETED | OUTPATIENT
Start: 2023-04-22 | End: 2023-04-22

## 2023-04-22 RX ORDER — POTASSIUM CHLORIDE 20 MEQ
40 PACKET (EA) ORAL ONCE
Refills: 0 | Status: COMPLETED | OUTPATIENT
Start: 2023-04-22 | End: 2023-04-22

## 2023-04-22 RX ORDER — ACETAMINOPHEN 500 MG
650 TABLET ORAL EVERY 6 HOURS
Refills: 0 | Status: COMPLETED | OUTPATIENT
Start: 2023-04-22 | End: 2023-04-23

## 2023-04-22 RX ORDER — POTASSIUM PHOSPHATE, MONOBASIC POTASSIUM PHOSPHATE, DIBASIC 236; 224 MG/ML; MG/ML
30 INJECTION, SOLUTION INTRAVENOUS ONCE
Refills: 0 | Status: COMPLETED | OUTPATIENT
Start: 2023-04-22 | End: 2023-04-22

## 2023-04-22 RX ADMIN — Medication 25 GRAM(S): at 09:51

## 2023-04-22 RX ADMIN — HEPARIN SODIUM 5000 UNIT(S): 5000 INJECTION INTRAVENOUS; SUBCUTANEOUS at 23:20

## 2023-04-22 RX ADMIN — OXYCODONE HYDROCHLORIDE 5 MILLIGRAM(S): 5 TABLET ORAL at 15:48

## 2023-04-22 RX ADMIN — OXYCODONE HYDROCHLORIDE 5 MILLIGRAM(S): 5 TABLET ORAL at 21:25

## 2023-04-22 RX ADMIN — Medication 650 MILLIGRAM(S): at 23:15

## 2023-04-22 RX ADMIN — Medication 40 MILLIEQUIVALENT(S): at 19:52

## 2023-04-22 RX ADMIN — PIPERACILLIN AND TAZOBACTAM 25 GRAM(S): 4; .5 INJECTION, POWDER, LYOPHILIZED, FOR SOLUTION INTRAVENOUS at 23:20

## 2023-04-22 RX ADMIN — Medication 650 MILLIGRAM(S): at 18:30

## 2023-04-22 RX ADMIN — OXYCODONE HYDROCHLORIDE 5 MILLIGRAM(S): 5 TABLET ORAL at 01:03

## 2023-04-22 RX ADMIN — PIPERACILLIN AND TAZOBACTAM 25 GRAM(S): 4; .5 INJECTION, POWDER, LYOPHILIZED, FOR SOLUTION INTRAVENOUS at 05:14

## 2023-04-22 RX ADMIN — Medication 400 MILLIGRAM(S): at 00:21

## 2023-04-22 RX ADMIN — POTASSIUM PHOSPHATE, MONOBASIC POTASSIUM PHOSPHATE, DIBASIC 83.33 MILLIMOLE(S): 236; 224 INJECTION, SOLUTION INTRAVENOUS at 12:01

## 2023-04-22 RX ADMIN — OXYCODONE HYDROCHLORIDE 5 MILLIGRAM(S): 5 TABLET ORAL at 05:14

## 2023-04-22 RX ADMIN — HEPARIN SODIUM 5000 UNIT(S): 5000 INJECTION INTRAVENOUS; SUBCUTANEOUS at 13:32

## 2023-04-22 RX ADMIN — OXYCODONE HYDROCHLORIDE 5 MILLIGRAM(S): 5 TABLET ORAL at 16:18

## 2023-04-22 RX ADMIN — OXYCODONE HYDROCHLORIDE 5 MILLIGRAM(S): 5 TABLET ORAL at 20:56

## 2023-04-22 RX ADMIN — Medication 400 MILLIGRAM(S): at 09:50

## 2023-04-22 RX ADMIN — Medication 1000 MILLIGRAM(S): at 00:51

## 2023-04-22 RX ADMIN — HEPARIN SODIUM 5000 UNIT(S): 5000 INJECTION INTRAVENOUS; SUBCUTANEOUS at 05:15

## 2023-04-22 RX ADMIN — Medication 1000 MILLIGRAM(S): at 10:15

## 2023-04-22 RX ADMIN — Medication 650 MILLIGRAM(S): at 23:45

## 2023-04-22 RX ADMIN — Medication 63.75 MILLIMOLE(S): at 19:53

## 2023-04-22 RX ADMIN — Medication 400 MILLIGRAM(S): at 05:15

## 2023-04-22 RX ADMIN — PIPERACILLIN AND TAZOBACTAM 25 GRAM(S): 4; .5 INJECTION, POWDER, LYOPHILIZED, FOR SOLUTION INTRAVENOUS at 13:32

## 2023-04-22 RX ADMIN — Medication 650 MILLIGRAM(S): at 17:40

## 2023-04-22 NOTE — PROGRESS NOTE ADULT - ASSESSMENT
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 4/20 S/p laparoscopic appendectomy and partial cecectomy    PLAN:  - Diet: Regular  - IVF  - Pain control  - Zosyn  - Monitor fever curve, REMI drain output  - DVT prophylaxis  - REMI teaching  - Potential discharge tomorrow     A Team Surgery  [id number]

## 2023-04-22 NOTE — PROGRESS NOTE ADULT - SUBJECTIVE AND OBJECTIVE BOX
Surgery Daily Progress Note  =====================================================  SUBJECTIVE: A 23y Female Patient seen and examined at bedside on AM rounds. Patient complains of nausea.    ALLERGIES:  No Known Allergies      --------------------------------------------------------------------------------------    MEDICATIONS:    Neurologic Medications  acetaminophen   IVPB .. 1000 milliGRAM(s) IV Intermittent once  ibuprofen  Tablet. 400 milliGRAM(s) Oral every 6 hours PRN Temp greater or equal to 38C (100.4F), Mild Pain (1 - 3)  oxyCODONE    IR 2.5 milliGRAM(s) Oral every 4 hours PRN Moderate Pain (4 - 6)  oxyCODONE    IR 5 milliGRAM(s) Oral every 4 hours PRN Severe Pain (7 - 10)    Respiratory Medications    Cardiovascular Medications    Gastrointestinal Medications  dextrose 5% + sodium chloride 0.45% 1000 milliLiter(s) IV Continuous <Continuous>    Genitourinary Medications    Hematologic/Oncologic Medications  heparin   Injectable 5000 Unit(s) SubCutaneous every 8 hours    Antimicrobial/Immunologic Medications  piperacillin/tazobactam IVPB.. 3.375 Gram(s) IV Intermittent every 8 hours    Endocrine/Metabolic Medications    Topical/Other Medications    --------------------------------------------------------------------------------------    VITAL SIGNS:  No Known Allergies      T(C): 38.6 (04-22-23 @ 09:10), Max: 38.6 (04-22-23 @ 09:10)  HR: 126 (04-22-23 @ 09:10) (88 - 126)  BP: 117/84 (04-22-23 @ 09:10) (90/53 - 117/84)  RR: 18 (04-22-23 @ 09:10) (18 - 18)  SpO2: 96% (04-22-23 @ 09:10) (96% - 100%)  --------------------------------------------------------------------------------------    EXAM    General: NAD, resting in bed comfortably.  Cardiac: regular rate, warm and well perfused  Respiratory: Nonlabored respirations, normal cw expansion.  Abdomen: soft, mildly tender on RLQ, nondistended. incision is c/d/i  Extremities: normal strength, FROM, no deformities    --------------------------------------------------------------------------------------    I&Os  T(C): 38.6 (04-22-23 @ 09:10), Max: 38.6 (04-22-23 @ 09:10)  HR: 126 (04-22-23 @ 09:10) (88 - 126)  BP: 117/84 (04-22-23 @ 09:10) (90/53 - 117/84)  RR: 18 (04-22-23 @ 09:10) (18 - 18)  SpO2: 96% (04-22-23 @ 09:10) (96% - 100%)  --------------------------------------------------------------------------------------    LABS                          10.0   17.16 )-----------( 404      ( 21 Apr 2023 06:20 )             30.3     04-21    134<L>  |  99  |  4<L>  ----------------------------<  99  3.8   |  21<L>  |  0.71    Ca    8.8      21 Apr 2023 06:20  Phos  2.3     04-21  Mg     1.60     04-21 04-21-23 @ 07:01  -  04-22-23 @ 07:00  --------------------------------------------------------  IN: 2300 mL / OUT: 35 mL / NET: 2265 mL      acetaminophen   IVPB .. 1000 milliGRAM(s) IV Intermittent once  dextrose 5% + sodium chloride 0.45% 1000 milliLiter(s) IV Continuous <Continuous>  heparin   Injectable 5000 Unit(s) SubCutaneous every 8 hours  ibuprofen  Tablet. 400 milliGRAM(s) Oral every 6 hours PRN  oxyCODONE    IR 2.5 milliGRAM(s) Oral every 4 hours PRN  oxyCODONE    IR 5 milliGRAM(s) Oral every 4 hours PRN  piperacillin/tazobactam IVPB.. 3.375 Gram(s) IV Intermittent every 8 hours      RADIOLOGY, EKG & ADDITIONAL TESTS: Reviewed.

## 2023-04-23 LAB
ANION GAP SERPL CALC-SCNC: 9 MMOL/L — SIGNIFICANT CHANGE UP (ref 7–14)
BUN SERPL-MCNC: 2 MG/DL — LOW (ref 7–23)
CALCIUM SERPL-MCNC: 8.5 MG/DL — SIGNIFICANT CHANGE UP (ref 8.4–10.5)
CHLORIDE SERPL-SCNC: 108 MMOL/L — HIGH (ref 98–107)
CO2 SERPL-SCNC: 21 MMOL/L — LOW (ref 22–31)
CREAT SERPL-MCNC: 0.72 MG/DL — SIGNIFICANT CHANGE UP (ref 0.5–1.3)
EGFR: 120 ML/MIN/1.73M2 — SIGNIFICANT CHANGE UP
GLUCOSE SERPL-MCNC: 112 MG/DL — HIGH (ref 70–99)
HCT VFR BLD CALC: 26.6 % — LOW (ref 34.5–45)
HGB BLD-MCNC: 8.5 G/DL — LOW (ref 11.5–15.5)
MAGNESIUM SERPL-MCNC: 2.1 MG/DL — SIGNIFICANT CHANGE UP (ref 1.6–2.6)
MCHC RBC-ENTMCNC: 28.7 PG — SIGNIFICANT CHANGE UP (ref 27–34)
MCHC RBC-ENTMCNC: 32 GM/DL — SIGNIFICANT CHANGE UP (ref 32–36)
MCV RBC AUTO: 89.9 FL — SIGNIFICANT CHANGE UP (ref 80–100)
NRBC # BLD: 0 /100 WBCS — SIGNIFICANT CHANGE UP (ref 0–0)
NRBC # FLD: 0 K/UL — SIGNIFICANT CHANGE UP (ref 0–0)
PHOSPHATE SERPL-MCNC: 2.7 MG/DL — SIGNIFICANT CHANGE UP (ref 2.5–4.5)
PLATELET # BLD AUTO: 383 K/UL — SIGNIFICANT CHANGE UP (ref 150–400)
POTASSIUM SERPL-MCNC: 4.2 MMOL/L — SIGNIFICANT CHANGE UP (ref 3.5–5.3)
POTASSIUM SERPL-SCNC: 4.2 MMOL/L — SIGNIFICANT CHANGE UP (ref 3.5–5.3)
RBC # BLD: 2.96 M/UL — LOW (ref 3.8–5.2)
RBC # FLD: 12.9 % — SIGNIFICANT CHANGE UP (ref 10.3–14.5)
SODIUM SERPL-SCNC: 138 MMOL/L — SIGNIFICANT CHANGE UP (ref 135–145)
WBC # BLD: 16.03 K/UL — HIGH (ref 3.8–10.5)
WBC # FLD AUTO: 16.03 K/UL — HIGH (ref 3.8–10.5)

## 2023-04-23 RX ORDER — SODIUM CHLORIDE 9 MG/ML
1000 INJECTION, SOLUTION INTRAVENOUS
Refills: 0 | Status: DISCONTINUED | OUTPATIENT
Start: 2023-04-23 | End: 2023-04-24

## 2023-04-23 RX ORDER — SODIUM CHLORIDE 9 MG/ML
1000 INJECTION, SOLUTION INTRAVENOUS ONCE
Refills: 0 | Status: COMPLETED | OUTPATIENT
Start: 2023-04-23 | End: 2023-04-23

## 2023-04-23 RX ADMIN — SODIUM CHLORIDE 1000 MILLILITER(S): 9 INJECTION, SOLUTION INTRAVENOUS at 07:06

## 2023-04-23 RX ADMIN — OXYCODONE HYDROCHLORIDE 5 MILLIGRAM(S): 5 TABLET ORAL at 19:25

## 2023-04-23 RX ADMIN — PIPERACILLIN AND TAZOBACTAM 25 GRAM(S): 4; .5 INJECTION, POWDER, LYOPHILIZED, FOR SOLUTION INTRAVENOUS at 14:22

## 2023-04-23 RX ADMIN — HEPARIN SODIUM 5000 UNIT(S): 5000 INJECTION INTRAVENOUS; SUBCUTANEOUS at 21:48

## 2023-04-23 RX ADMIN — Medication 650 MILLIGRAM(S): at 09:35

## 2023-04-23 RX ADMIN — HEPARIN SODIUM 5000 UNIT(S): 5000 INJECTION INTRAVENOUS; SUBCUTANEOUS at 06:42

## 2023-04-23 RX ADMIN — OXYCODONE HYDROCHLORIDE 5 MILLIGRAM(S): 5 TABLET ORAL at 01:30

## 2023-04-23 RX ADMIN — Medication 650 MILLIGRAM(S): at 16:54

## 2023-04-23 RX ADMIN — Medication 85 MILLIMOLE(S): at 11:24

## 2023-04-23 RX ADMIN — OXYCODONE HYDROCHLORIDE 5 MILLIGRAM(S): 5 TABLET ORAL at 07:28

## 2023-04-23 RX ADMIN — OXYCODONE HYDROCHLORIDE 5 MILLIGRAM(S): 5 TABLET ORAL at 20:25

## 2023-04-23 RX ADMIN — OXYCODONE HYDROCHLORIDE 5 MILLIGRAM(S): 5 TABLET ORAL at 01:03

## 2023-04-23 RX ADMIN — OXYCODONE HYDROCHLORIDE 5 MILLIGRAM(S): 5 TABLET ORAL at 08:28

## 2023-04-23 RX ADMIN — Medication 650 MILLIGRAM(S): at 15:54

## 2023-04-23 RX ADMIN — PIPERACILLIN AND TAZOBACTAM 25 GRAM(S): 4; .5 INJECTION, POWDER, LYOPHILIZED, FOR SOLUTION INTRAVENOUS at 06:33

## 2023-04-23 RX ADMIN — HEPARIN SODIUM 5000 UNIT(S): 5000 INJECTION INTRAVENOUS; SUBCUTANEOUS at 14:26

## 2023-04-23 RX ADMIN — PIPERACILLIN AND TAZOBACTAM 25 GRAM(S): 4; .5 INJECTION, POWDER, LYOPHILIZED, FOR SOLUTION INTRAVENOUS at 21:48

## 2023-04-23 RX ADMIN — Medication 650 MILLIGRAM(S): at 10:35

## 2023-04-23 NOTE — PROGRESS NOTE ADULT - ASSESSMENT
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 4/20 S/p laparoscopic cholecystectomy and partial cecectomy    PLAN:  - Diet: CLD  - IVF  - 1 L bolus   - Pain control  - Zosyn  - Monitor fever curve, REMI drain output  - DVT prophylaxis  - REMI teaching  - Fever work up sent 04/23 AM     A Team Surgery  [id number] 23 year old female with PMH Hirshsprung s/p pull through and rectoplasty readmitted for perforated appendicitis. 4/20 S/p laparoscopic appendectomy and partial cecectomy    PLAN:  - Diet: CLD  - IVF  - 1 L bolus   - Pain control  - Zosyn  - Monitor fever curve, REMI drain output  - DVT prophylaxis  - REMI teaching  - Fever work up sent 04/23 AM     A Team Surgery  [id number]

## 2023-04-23 NOTE — PROGRESS NOTE ADULT - SUBJECTIVE AND OBJECTIVE BOX
Surgery Daily Progress Note  =====================================================  SUBJECTIVE: A 23y Female Patient seen and examined at bedside on AM rounds. Patient complains of abdominal pain, ND, global tenderness. +ve,+ve.     ALLERGIES:  No Known Allergies      --------------------------------------------------------------------------------------    MEDICATIONS:    Neurologic Medications  acetaminophen     Tablet .. 650 milliGRAM(s) Oral every 6 hours PRN Temp greater or equal to 38.5C (101.3F), Moderate Pain (4 - 6)  ibuprofen  Tablet. 400 milliGRAM(s) Oral every 6 hours PRN Temp greater or equal to 38C (100.4F), Mild Pain (1 - 3)  oxyCODONE    IR 2.5 milliGRAM(s) Oral every 4 hours PRN Moderate Pain (4 - 6)  oxyCODONE    IR 5 milliGRAM(s) Oral every 4 hours PRN Severe Pain (7 - 10)    Respiratory Medications    Cardiovascular Medications    Gastrointestinal Medications  dextrose 5% + sodium chloride 0.45% 1000 milliLiter(s) IV Continuous <Continuous>  sodium phosphate 30 milliMole(s)/500 mL IVPB 30 milliMole(s) IV Intermittent once    Genitourinary Medications    Hematologic/Oncologic Medications  heparin   Injectable 5000 Unit(s) SubCutaneous every 8 hours    Antimicrobial/Immunologic Medications  piperacillin/tazobactam IVPB.. 3.375 Gram(s) IV Intermittent every 8 hours    Endocrine/Metabolic Medications    Topical/Other Medications    --------------------------------------------------------------------------------------    VITAL SIGNS:  No Known Allergies      T(C): 37 (23 @ 06:30), Max: 38.8 (23 @ 10:16)  HR: 95 (23 @ 06:30) (95 - 128)  BP: 95/60 (23 @ 06:30) (95/60 - 114/67)  RR: 18 (23 @ 06:30) (18 - 18)  SpO2: 99% (23 @ 06:30) (95% - 100%)  --------------------------------------------------------------------------------------    EXAM    General: NAD, resting in bed comfortably.  Cardiac: regular rate, warm and well perfused  Respiratory: Nonlabored respirations, normal cw expansion.  Abdomen: soft, nontender, nondistended. incision is c/d/i, ostomy.   Extremities: normal strength, FROM, no deformities    --------------------------------------------------------------------------------------    I&Os  T(C): 37 (23 @ 06:30), Max: 38.8 (23 @ 10:16)  HR: 95 (23 @ 06:30) (95 - 128)  BP: 95/60 (23 @ 06:30) (95/60 - 114/67)  RR: 18 (23 @ 06:30) (18 - 18)  SpO2: 99% (23 @ 06:30) (95% - 100%)  --------------------------------------------------------------------------------------    LABS                          8.5    16.03 )-----------( 383      ( 2023 05:47 )             26.6         138  |  108<H>  |  2<L>  ----------------------------<  112<H>  4.2   |  21<L>  |  0.72    Ca    8.5      2023 05:47  Phos  2.7       Mg     2.10             Urinalysis Basic - ( 2023 21:59 )    Color: Colorless / Appearance: Clear / S.004 / pH: x  Gluc: x / Ketone: Negative  / Bili: Negative / Urobili: <2 mg/dL   Blood: x / Protein: Negative / Nitrite: Negative   Leuk Esterase: Negative / RBC: 1 /HPF / WBC 0 /HPF   Sq Epi: x / Non Sq Epi: x / Bacteria: Negative        23 @ 07:01  -  23 @ 07:00  --------------------------------------------------------  IN: 3150 mL / OUT: 1690 mL / NET: 1460 mL      acetaminophen     Tablet .. 650 milliGRAM(s) Oral every 6 hours PRN  dextrose 5% + sodium chloride 0.45% 1000 milliLiter(s) IV Continuous <Continuous>  heparin   Injectable 5000 Unit(s) SubCutaneous every 8 hours  ibuprofen  Tablet. 400 milliGRAM(s) Oral every 6 hours PRN  oxyCODONE    IR 2.5 milliGRAM(s) Oral every 4 hours PRN  oxyCODONE    IR 5 milliGRAM(s) Oral every 4 hours PRN  piperacillin/tazobactam IVPB.. 3.375 Gram(s) IV Intermittent every 8 hours  sodium phosphate 30 milliMole(s)/500 mL IVPB 30 milliMole(s) IV Intermittent once      RADIOLOGY, EKG & ADDITIONAL TESTS: Reviewed.

## 2023-04-24 LAB
ANION GAP SERPL CALC-SCNC: 9 MMOL/L — SIGNIFICANT CHANGE UP (ref 7–14)
BUN SERPL-MCNC: 2 MG/DL — LOW (ref 7–23)
CALCIUM SERPL-MCNC: 8.2 MG/DL — LOW (ref 8.4–10.5)
CHLORIDE SERPL-SCNC: 104 MMOL/L — SIGNIFICANT CHANGE UP (ref 98–107)
CO2 SERPL-SCNC: 23 MMOL/L — SIGNIFICANT CHANGE UP (ref 22–31)
CREAT SERPL-MCNC: 0.69 MG/DL — SIGNIFICANT CHANGE UP (ref 0.5–1.3)
EGFR: 125 ML/MIN/1.73M2 — SIGNIFICANT CHANGE UP
GLUCOSE SERPL-MCNC: 108 MG/DL — HIGH (ref 70–99)
HCT VFR BLD CALC: 24.4 % — LOW (ref 34.5–45)
HGB BLD-MCNC: 7.9 G/DL — LOW (ref 11.5–15.5)
MAGNESIUM SERPL-MCNC: 2.1 MG/DL — SIGNIFICANT CHANGE UP (ref 1.6–2.6)
MCHC RBC-ENTMCNC: 29 PG — SIGNIFICANT CHANGE UP (ref 27–34)
MCHC RBC-ENTMCNC: 32.4 GM/DL — SIGNIFICANT CHANGE UP (ref 32–36)
MCV RBC AUTO: 89.7 FL — SIGNIFICANT CHANGE UP (ref 80–100)
NRBC # BLD: 0 /100 WBCS — SIGNIFICANT CHANGE UP (ref 0–0)
NRBC # FLD: 0 K/UL — SIGNIFICANT CHANGE UP (ref 0–0)
PHOSPHATE SERPL-MCNC: 3.9 MG/DL — SIGNIFICANT CHANGE UP (ref 2.5–4.5)
PLATELET # BLD AUTO: 382 K/UL — SIGNIFICANT CHANGE UP (ref 150–400)
POTASSIUM SERPL-MCNC: 3.8 MMOL/L — SIGNIFICANT CHANGE UP (ref 3.5–5.3)
POTASSIUM SERPL-SCNC: 3.8 MMOL/L — SIGNIFICANT CHANGE UP (ref 3.5–5.3)
RBC # BLD: 2.72 M/UL — LOW (ref 3.8–5.2)
RBC # FLD: 13.1 % — SIGNIFICANT CHANGE UP (ref 10.3–14.5)
SODIUM SERPL-SCNC: 136 MMOL/L — SIGNIFICANT CHANGE UP (ref 135–145)
WBC # BLD: 11.48 K/UL — HIGH (ref 3.8–10.5)
WBC # FLD AUTO: 11.48 K/UL — HIGH (ref 3.8–10.5)

## 2023-04-24 RX ORDER — POTASSIUM PHOSPHATE, MONOBASIC POTASSIUM PHOSPHATE, DIBASIC 236; 224 MG/ML; MG/ML
30 INJECTION, SOLUTION INTRAVENOUS ONCE
Refills: 0 | Status: DISCONTINUED | OUTPATIENT
Start: 2023-04-24 | End: 2023-04-24

## 2023-04-24 RX ORDER — ACETAMINOPHEN 500 MG
1000 TABLET ORAL EVERY 6 HOURS
Refills: 0 | Status: DISCONTINUED | OUTPATIENT
Start: 2023-04-24 | End: 2023-04-29

## 2023-04-24 RX ORDER — SODIUM CHLORIDE 9 MG/ML
1000 INJECTION, SOLUTION INTRAVENOUS
Refills: 0 | Status: DISCONTINUED | OUTPATIENT
Start: 2023-04-24 | End: 2023-04-27

## 2023-04-24 RX ADMIN — PIPERACILLIN AND TAZOBACTAM 25 GRAM(S): 4; .5 INJECTION, POWDER, LYOPHILIZED, FOR SOLUTION INTRAVENOUS at 22:20

## 2023-04-24 RX ADMIN — Medication 1000 MILLIGRAM(S): at 17:38

## 2023-04-24 RX ADMIN — Medication 400 MILLIGRAM(S): at 01:55

## 2023-04-24 RX ADMIN — OXYCODONE HYDROCHLORIDE 5 MILLIGRAM(S): 5 TABLET ORAL at 00:44

## 2023-04-24 RX ADMIN — Medication 400 MILLIGRAM(S): at 04:00

## 2023-04-24 RX ADMIN — OXYCODONE HYDROCHLORIDE 5 MILLIGRAM(S): 5 TABLET ORAL at 23:24

## 2023-04-24 RX ADMIN — OXYCODONE HYDROCHLORIDE 5 MILLIGRAM(S): 5 TABLET ORAL at 22:54

## 2023-04-24 RX ADMIN — HEPARIN SODIUM 5000 UNIT(S): 5000 INJECTION INTRAVENOUS; SUBCUTANEOUS at 05:37

## 2023-04-24 RX ADMIN — OXYCODONE HYDROCHLORIDE 5 MILLIGRAM(S): 5 TABLET ORAL at 12:57

## 2023-04-24 RX ADMIN — OXYCODONE HYDROCHLORIDE 2.5 MILLIGRAM(S): 5 TABLET ORAL at 04:11

## 2023-04-24 RX ADMIN — HEPARIN SODIUM 5000 UNIT(S): 5000 INJECTION INTRAVENOUS; SUBCUTANEOUS at 12:55

## 2023-04-24 RX ADMIN — OXYCODONE HYDROCHLORIDE 2.5 MILLIGRAM(S): 5 TABLET ORAL at 03:46

## 2023-04-24 RX ADMIN — OXYCODONE HYDROCHLORIDE 5 MILLIGRAM(S): 5 TABLET ORAL at 13:30

## 2023-04-24 RX ADMIN — PIPERACILLIN AND TAZOBACTAM 25 GRAM(S): 4; .5 INJECTION, POWDER, LYOPHILIZED, FOR SOLUTION INTRAVENOUS at 05:37

## 2023-04-24 RX ADMIN — PIPERACILLIN AND TAZOBACTAM 25 GRAM(S): 4; .5 INJECTION, POWDER, LYOPHILIZED, FOR SOLUTION INTRAVENOUS at 12:54

## 2023-04-24 RX ADMIN — OXYCODONE HYDROCHLORIDE 5 MILLIGRAM(S): 5 TABLET ORAL at 01:14

## 2023-04-24 RX ADMIN — Medication 1000 MILLIGRAM(S): at 13:30

## 2023-04-24 RX ADMIN — Medication 400 MILLIGRAM(S): at 16:29

## 2023-04-24 RX ADMIN — Medication 1000 MILLIGRAM(S): at 12:54

## 2023-04-24 RX ADMIN — Medication 400 MILLIGRAM(S): at 18:11

## 2023-04-24 RX ADMIN — Medication 1000 MILLIGRAM(S): at 18:11

## 2023-04-24 NOTE — PROGRESS NOTE ADULT - SUBJECTIVE AND OBJECTIVE BOX
TEAM [ A ] Surgery Daily Progress Note  =====================================================    SUBJECTIVE: Patient seen and examined at bedside on AM rounds. Patient reports that they're feeling well. Overnight, patient was febrile Tmax 103.1F. Patient is tolerating clear liquids without N/V. She is passing gas and having diarrhea. Denies chest pain, SOB    ALLERGIES:  No Known Allergies      --------------------------------------------------------------------------------------    MEDICATIONS:    Neurologic Medications  ibuprofen  Tablet. 400 milliGRAM(s) Oral every 6 hours PRN Temp greater or equal to 38C (100.4F), Mild Pain (1 - 3)  oxyCODONE    IR 2.5 milliGRAM(s) Oral every 4 hours PRN Moderate Pain (4 - 6)  oxyCODONE    IR 5 milliGRAM(s) Oral every 4 hours PRN Severe Pain (7 - 10)    Respiratory Medications    Cardiovascular Medications    Gastrointestinal Medications  dextrose 5% + sodium chloride 0.45% 1000 milliLiter(s) IV Continuous <Continuous>    Genitourinary Medications    Hematologic/Oncologic Medications  heparin   Injectable 5000 Unit(s) SubCutaneous every 8 hours    Antimicrobial/Immunologic Medications  piperacillin/tazobactam IVPB.. 3.375 Gram(s) IV Intermittent every 8 hours    Endocrine/Metabolic Medications    Topical/Other Medications    --------------------------------------------------------------------------------------    VITAL SIGNS:  ICU Vital Signs Last 24 Hrs  T(C): 36.7 (24 Apr 2023 05:50), Max: 39.5 (23 Apr 2023 10:15)  T(F): 98.1 (24 Apr 2023 05:50), Max: 103.1 (23 Apr 2023 10:15)  HR: 102 (24 Apr 2023 05:50) (84 - 116)  BP: 102/58 (24 Apr 2023 05:50) (102/58 - 106/63)  BP(mean): --  ABP: --  ABP(mean): --  RR: 17 (24 Apr 2023 05:50) (17 - 18)  SpO2: 97% (24 Apr 2023 05:50) (94% - 99%)    O2 Parameters below as of 24 Apr 2023 05:50  Patient On (Oxygen Delivery Method): room air  --------------------------------------------------------------------------------------    EXAM    General: NAD, resting in bed comfortably.  Cardiac: regular rate, warm and well perfused  Respiratory: Nonlabored respirations, normal cw expansion.  Abdomen: soft, nontender, nondistended, surgical incisions c/d/i, REMI X 1 SS  Extremities: normal strength, FROM, no deformities    --------------------------------------------------------------------------------------    LABS                        7.9    11.48 )-----------( 382      ( 24 Apr 2023 05:49 )             24.4   04-23    138  |  108<H>  |  2<L>  ----------------------------<  112<H>  4.2   |  21<L>  |  0.72    Ca    8.5      23 Apr 2023 05:47  Phos  2.7     04-23  Mg     2.10     04-23  --------------------------------------------------------------------------------------    INS AND OUTS:  I&O's Detail    23 Apr 2023 07:01  -  24 Apr 2023 07:00  --------------------------------------------------------  IN:    dextrose 5% + sodium chloride 0.45% w/ Additives: 300 mL    dextrose 5% + sodium chloride 0.45% w/ Additives: 900 mL    IV PiggyBack: 200 mL    Oral Fluid: 1440 mL  Total IN: 2840 mL    OUT:    Drain (mL): 100 mL  Total OUT: 100 mL    Total NET: 2740 mL        --------------------------------------------------------------------------------------

## 2023-04-24 NOTE — PROGRESS NOTE ADULT - ASSESSMENT
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 4/20 S/p laparoscopic cholecystectomy and partial cecectomy. Postoperative course complicated by persistent fevers (negative UA, chest xray with mild atelectasis)    PLAN:  - Diet: CLD  - IVF  - Pain control  - Tylenol and ibuprofen for antipyretic   - Zosyn  - Monitor fever curve, REMI drain output  - DVT prophylaxis  - Plan for CT A/P tomorrow 4/25    A Team Surgery  [id number]   23 year old female with PMH Hirshsprung s/p pull through and rectoplasty readmitted for perforated appendicitis. 4/20 S/p laparoscopic appendectomy and partial cecectomy. Postoperative course complicated by persistent fevers (negative UA, chest xray with mild atelectasis)    PLAN:  - Diet: CLD  - IVF  - Pain control  - Tylenol and ibuprofen for antipyretic   - Zosyn  - Monitor fever curve, REMI drain output  - DVT prophylaxis  - Plan for CT A/P tomorrow 4/25    A Team Surgery  [id number]

## 2023-04-25 LAB
ANION GAP SERPL CALC-SCNC: 10 MMOL/L — SIGNIFICANT CHANGE UP (ref 7–14)
BLD GP AB SCN SERPL QL: NEGATIVE — SIGNIFICANT CHANGE UP
BUN SERPL-MCNC: 2 MG/DL — LOW (ref 7–23)
CALCIUM SERPL-MCNC: 8.7 MG/DL — SIGNIFICANT CHANGE UP (ref 8.4–10.5)
CHLORIDE SERPL-SCNC: 104 MMOL/L — SIGNIFICANT CHANGE UP (ref 98–107)
CO2 SERPL-SCNC: 23 MMOL/L — SIGNIFICANT CHANGE UP (ref 22–31)
CREAT SERPL-MCNC: 0.66 MG/DL — SIGNIFICANT CHANGE UP (ref 0.5–1.3)
EGFR: 126 ML/MIN/1.73M2 — SIGNIFICANT CHANGE UP
GLUCOSE SERPL-MCNC: 114 MG/DL — HIGH (ref 70–99)
HCT VFR BLD CALC: 26 % — LOW (ref 34.5–45)
HGB BLD-MCNC: 8.7 G/DL — LOW (ref 11.5–15.5)
MAGNESIUM SERPL-MCNC: 1.9 MG/DL — SIGNIFICANT CHANGE UP (ref 1.6–2.6)
MCHC RBC-ENTMCNC: 30.3 PG — SIGNIFICANT CHANGE UP (ref 27–34)
MCHC RBC-ENTMCNC: 33.5 GM/DL — SIGNIFICANT CHANGE UP (ref 32–36)
MCV RBC AUTO: 90.6 FL — SIGNIFICANT CHANGE UP (ref 80–100)
NRBC # BLD: 0 /100 WBCS — SIGNIFICANT CHANGE UP (ref 0–0)
NRBC # FLD: 0 K/UL — SIGNIFICANT CHANGE UP (ref 0–0)
PHOSPHATE SERPL-MCNC: 3.6 MG/DL — SIGNIFICANT CHANGE UP (ref 2.5–4.5)
PLATELET # BLD AUTO: 420 K/UL — HIGH (ref 150–400)
POTASSIUM SERPL-MCNC: 4.1 MMOL/L — SIGNIFICANT CHANGE UP (ref 3.5–5.3)
POTASSIUM SERPL-SCNC: 4.1 MMOL/L — SIGNIFICANT CHANGE UP (ref 3.5–5.3)
RBC # BLD: 2.87 M/UL — LOW (ref 3.8–5.2)
RBC # FLD: 13.1 % — SIGNIFICANT CHANGE UP (ref 10.3–14.5)
RH IG SCN BLD-IMP: POSITIVE — SIGNIFICANT CHANGE UP
SODIUM SERPL-SCNC: 137 MMOL/L — SIGNIFICANT CHANGE UP (ref 135–145)
WBC # BLD: 13.91 K/UL — HIGH (ref 3.8–10.5)
WBC # FLD AUTO: 13.91 K/UL — HIGH (ref 3.8–10.5)

## 2023-04-25 PROCEDURE — 74177 CT ABD & PELVIS W/CONTRAST: CPT | Mod: 26

## 2023-04-25 RX ADMIN — SODIUM CHLORIDE 100 MILLILITER(S): 9 INJECTION, SOLUTION INTRAVENOUS at 22:30

## 2023-04-25 RX ADMIN — Medication 1000 MILLIGRAM(S): at 08:02

## 2023-04-25 RX ADMIN — PIPERACILLIN AND TAZOBACTAM 25 GRAM(S): 4; .5 INJECTION, POWDER, LYOPHILIZED, FOR SOLUTION INTRAVENOUS at 06:44

## 2023-04-25 RX ADMIN — Medication 400 MILLIGRAM(S): at 09:04

## 2023-04-25 RX ADMIN — Medication 1000 MILLIGRAM(S): at 02:50

## 2023-04-25 RX ADMIN — OXYCODONE HYDROCHLORIDE 5 MILLIGRAM(S): 5 TABLET ORAL at 03:03

## 2023-04-25 RX ADMIN — OXYCODONE HYDROCHLORIDE 2.5 MILLIGRAM(S): 5 TABLET ORAL at 06:48

## 2023-04-25 RX ADMIN — Medication 1000 MILLIGRAM(S): at 19:18

## 2023-04-25 RX ADMIN — Medication 400 MILLIGRAM(S): at 09:34

## 2023-04-25 RX ADMIN — Medication 1000 MILLIGRAM(S): at 13:19

## 2023-04-25 RX ADMIN — OXYCODONE HYDROCHLORIDE 2.5 MILLIGRAM(S): 5 TABLET ORAL at 07:18

## 2023-04-25 RX ADMIN — PIPERACILLIN AND TAZOBACTAM 25 GRAM(S): 4; .5 INJECTION, POWDER, LYOPHILIZED, FOR SOLUTION INTRAVENOUS at 22:30

## 2023-04-25 RX ADMIN — PIPERACILLIN AND TAZOBACTAM 25 GRAM(S): 4; .5 INJECTION, POWDER, LYOPHILIZED, FOR SOLUTION INTRAVENOUS at 13:22

## 2023-04-25 RX ADMIN — Medication 1000 MILLIGRAM(S): at 19:48

## 2023-04-25 RX ADMIN — Medication 1000 MILLIGRAM(S): at 13:49

## 2023-04-25 RX ADMIN — Medication 1000 MILLIGRAM(S): at 02:20

## 2023-04-25 RX ADMIN — Medication 1000 MILLIGRAM(S): at 08:32

## 2023-04-25 RX ADMIN — OXYCODONE HYDROCHLORIDE 5 MILLIGRAM(S): 5 TABLET ORAL at 03:33

## 2023-04-25 NOTE — PROVIDER CONTACT NOTE (OTHER) - ACTION/TREATMENT ORDERED:
awaiting for call back
Provider to order IB profen for pt
MD made aware, pt given PRN tylenol. No new order at this time.
MD made aware, pt given PRN [id number]. No new order at this time.
As per MD medicate as ordered.

## 2023-04-25 NOTE — PROVIDER CONTACT NOTE (OTHER) - SITUATION
Pt A&Ox4, s/p lap cecectomy and lap appe. Pt febrile at 102.2. Tylenol and ice packs administered. Temp now 101.3
low bp
temp 101.3 and headache 8/10
Patient is Clair.103.F
Pt A&Ox4, s/p lap cecectomy and lap appendectomy

## 2023-04-25 NOTE — PROVIDER CONTACT NOTE (OTHER) - ASSESSMENT
Temp 103.1, Hr Tachy, other vitals within range.
Temp 103.1, Hr Tachy, other vitals within range.
Pt A&Ox4, OOB self, /52, hr 126, temp 101.3, 02 100% on RA, RR 18.
Pt a&ox4. VS WDL. Complaining of headache and has a fever of 101.3.
remaining of vitals stable. Denies dizziness

## 2023-04-25 NOTE — PROGRESS NOTE ADULT - SUBJECTIVE AND OBJECTIVE BOX
TEAM [ A ] Surgery Daily Progress Note  =====================================================    SUBJECTIVE: Patient seen and examined at bedside on AM rounds. Patient reports that they're feeling well. Patient afebrile overnight. Patient is passing gas but without BM for 1 day. Denies fever, chills, N/V, chest pain, SOB    ALLERGIES:  No Known Allergies      --------------------------------------------------------------------------------------    MEDICATIONS:    Neurologic Medications  acetaminophen     Tablet .. 1000 milliGRAM(s) Oral every 6 hours  ibuprofen  Tablet. 400 milliGRAM(s) Oral every 6 hours PRN Temp greater or equal to 38C (100.4F), Mild Pain (1 - 3)  oxyCODONE    IR 2.5 milliGRAM(s) Oral every 4 hours PRN Moderate Pain (4 - 6)  oxyCODONE    IR 5 milliGRAM(s) Oral every 4 hours PRN Severe Pain (7 - 10)    Respiratory Medications    Cardiovascular Medications    Gastrointestinal Medications  dextrose 5% + sodium chloride 0.45% 1000 milliLiter(s) IV Continuous <Continuous>    Genitourinary Medications    Hematologic/Oncologic Medications    Antimicrobial/Immunologic Medications  piperacillin/tazobactam IVPB.. 3.375 Gram(s) IV Intermittent every 8 hours    Endocrine/Metabolic Medications    Topical/Other Medications    --------------------------------------------------------------------------------------    VITAL SIGNS:  ICU Vital Signs Last 24 Hrs  T(C): 37.1 (25 Apr 2023 05:24), Max: 37.4 (24 Apr 2023 15:40)  T(F): 98.8 (25 Apr 2023 05:24), Max: 99.4 (24 Apr 2023 15:40)  HR: 90 (25 Apr 2023 05:24) (73 - 110)  BP: 102/60 (25 Apr 2023 05:24) (102/60 - 109/68)  BP(mean): --  ABP: --  ABP(mean): --  RR: 18 (25 Apr 2023 05:24) (17 - 18)  SpO2: 98% (25 Apr 2023 05:24) (97% - 100%)    O2 Parameters below as of 25 Apr 2023 05:24  Patient On (Oxygen Delivery Method): room air  --------------------------------------------------------------------------------------    EXAM    General: NAD, resting in bed comfortably.  Cardiac: regular rate, warm and well perfused  Respiratory: Nonlabored respirations, normal cw expansion.  Abdomen: soft, nontender, nondistended, REMI x 1 purulent  Extremities: normal strength, FROM, no deformities    --------------------------------------------------------------------------------------    LABS                        7.9    11.48 )-----------( 382      ( 24 Apr 2023 05:49 )             24.4   04-24    136  |  104  |  2<L>  ----------------------------<  108<H>  3.8   |  23  |  0.69    Ca    8.2<L>      24 Apr 2023 05:49  Phos  3.9     04-24  Mg     2.10     04-24        --------------------------------------------------------------------------------------    INS AND OUTS:  I&O's Detail    24 Apr 2023 07:01  -  25 Apr 2023 07:00  --------------------------------------------------------  IN:    dextrose 5% + sodium chloride 0.45% w/ Additives: 900 mL    IV PiggyBack: 100 mL  Total IN: 1000 mL    OUT:    Drain (mL): 55 mL  Total OUT: 55 mL    Total NET: 945 mL  --------------------------------------------------------------------------------------

## 2023-04-25 NOTE — PROGRESS NOTE ADULT - ASSESSMENT
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 4/20 S/p laparoscopic cholecystectomy and partial cecectomy. Postoperative course complicated by persistent fevers (negative UA, chest xray with mild atelectasis)    PLAN:  - CT scan A/P with IV contrast  - Diet: NPO  - IVF  - Pain control  - Tylenol and ibuprofen for antipyretic   - Zosyn  - Monitor fever curve, REMI drain output  - Holding DVT prophylaxis    A Team Surgery  [id number]   23 year old female with PMH Hirshsprung s/p pull through and rectoplasty readmitted for perforated appendicitis. 4/20 S/p laparoscopic appendectomy and partial cecectomy. Postoperative course complicated by persistent fevers (negative UA, chest xray with mild atelectasis)    PLAN:  - CT scan A/P with IV contrast  - Diet: NPO  - IVF  - Pain control  - Tylenol and ibuprofen for antipyretic   - Zosyn  - Monitor fever curve, REMI drain output  - Holding DVT prophylaxis    A Team Surgery  [id number]

## 2023-04-26 LAB
ANION GAP SERPL CALC-SCNC: 14 MMOL/L — SIGNIFICANT CHANGE UP (ref 7–14)
APTT BLD: 32.4 SEC — SIGNIFICANT CHANGE UP (ref 27–36.3)
APTT BLD: 34.3 SEC — SIGNIFICANT CHANGE UP (ref 27–36.3)
BUN SERPL-MCNC: 2 MG/DL — LOW (ref 7–23)
CALCIUM SERPL-MCNC: 9.3 MG/DL — SIGNIFICANT CHANGE UP (ref 8.4–10.5)
CHLORIDE SERPL-SCNC: 102 MMOL/L — SIGNIFICANT CHANGE UP (ref 98–107)
CO2 SERPL-SCNC: 23 MMOL/L — SIGNIFICANT CHANGE UP (ref 22–31)
CREAT SERPL-MCNC: 0.65 MG/DL — SIGNIFICANT CHANGE UP (ref 0.5–1.3)
EGFR: 127 ML/MIN/1.73M2 — SIGNIFICANT CHANGE UP
GLUCOSE SERPL-MCNC: 103 MG/DL — HIGH (ref 70–99)
HCT VFR BLD CALC: 29.5 % — LOW (ref 34.5–45)
HGB BLD-MCNC: 9.5 G/DL — LOW (ref 11.5–15.5)
INR BLD: 1.53 RATIO — HIGH (ref 0.88–1.16)
INR BLD: 1.68 RATIO — HIGH (ref 0.88–1.16)
MAGNESIUM SERPL-MCNC: 2.1 MG/DL — SIGNIFICANT CHANGE UP (ref 1.6–2.6)
MCHC RBC-ENTMCNC: 28.9 PG — SIGNIFICANT CHANGE UP (ref 27–34)
MCHC RBC-ENTMCNC: 32.2 GM/DL — SIGNIFICANT CHANGE UP (ref 32–36)
MCV RBC AUTO: 89.7 FL — SIGNIFICANT CHANGE UP (ref 80–100)
NRBC # BLD: 0 /100 WBCS — SIGNIFICANT CHANGE UP (ref 0–0)
NRBC # FLD: 0.02 K/UL — HIGH (ref 0–0)
PHOSPHATE SERPL-MCNC: 3.8 MG/DL — SIGNIFICANT CHANGE UP (ref 2.5–4.5)
PLATELET # BLD AUTO: 543 K/UL — HIGH (ref 150–400)
POTASSIUM SERPL-MCNC: 4.4 MMOL/L — SIGNIFICANT CHANGE UP (ref 3.5–5.3)
POTASSIUM SERPL-SCNC: 4.4 MMOL/L — SIGNIFICANT CHANGE UP (ref 3.5–5.3)
PROTHROM AB SERPL-ACNC: 17.8 SEC — HIGH (ref 10.5–13.4)
PROTHROM AB SERPL-ACNC: 19.6 SEC — HIGH (ref 10.5–13.4)
RBC # BLD: 3.29 M/UL — LOW (ref 3.8–5.2)
RBC # FLD: 13.2 % — SIGNIFICANT CHANGE UP (ref 10.3–14.5)
SODIUM SERPL-SCNC: 139 MMOL/L — SIGNIFICANT CHANGE UP (ref 135–145)
WBC # BLD: 15.09 K/UL — HIGH (ref 3.8–10.5)
WBC # FLD AUTO: 15.09 K/UL — HIGH (ref 3.8–10.5)

## 2023-04-26 PROCEDURE — 10160 PNXR ASPIR ABSC HMTMA BULLA: CPT

## 2023-04-26 PROCEDURE — 76942 ECHO GUIDE FOR BIOPSY: CPT | Mod: 26

## 2023-04-26 RX ORDER — PHYTONADIONE (VIT K1) 5 MG
10 TABLET ORAL ONCE
Refills: 0 | Status: COMPLETED | OUTPATIENT
Start: 2023-04-26 | End: 2023-04-26

## 2023-04-26 RX ORDER — ENOXAPARIN SODIUM 100 MG/ML
40 INJECTION SUBCUTANEOUS EVERY 24 HOURS
Refills: 0 | Status: DISCONTINUED | OUTPATIENT
Start: 2023-04-26 | End: 2023-04-29

## 2023-04-26 RX ADMIN — Medication 102 MILLIGRAM(S): at 08:00

## 2023-04-26 RX ADMIN — OXYCODONE HYDROCHLORIDE 2.5 MILLIGRAM(S): 5 TABLET ORAL at 02:03

## 2023-04-26 RX ADMIN — Medication 1000 MILLIGRAM(S): at 00:39

## 2023-04-26 RX ADMIN — Medication 1000 MILLIGRAM(S): at 08:30

## 2023-04-26 RX ADMIN — Medication 1000 MILLIGRAM(S): at 08:00

## 2023-04-26 RX ADMIN — PIPERACILLIN AND TAZOBACTAM 25 GRAM(S): 4; .5 INJECTION, POWDER, LYOPHILIZED, FOR SOLUTION INTRAVENOUS at 05:37

## 2023-04-26 RX ADMIN — OXYCODONE HYDROCHLORIDE 5 MILLIGRAM(S): 5 TABLET ORAL at 18:06

## 2023-04-26 RX ADMIN — OXYCODONE HYDROCHLORIDE 2.5 MILLIGRAM(S): 5 TABLET ORAL at 01:33

## 2023-04-26 RX ADMIN — OXYCODONE HYDROCHLORIDE 5 MILLIGRAM(S): 5 TABLET ORAL at 06:07

## 2023-04-26 RX ADMIN — Medication 102 MILLIGRAM(S): at 13:50

## 2023-04-26 RX ADMIN — OXYCODONE HYDROCHLORIDE 5 MILLIGRAM(S): 5 TABLET ORAL at 18:36

## 2023-04-26 RX ADMIN — OXYCODONE HYDROCHLORIDE 5 MILLIGRAM(S): 5 TABLET ORAL at 05:37

## 2023-04-26 RX ADMIN — SODIUM CHLORIDE 100 MILLILITER(S): 9 INJECTION, SOLUTION INTRAVENOUS at 22:14

## 2023-04-26 RX ADMIN — Medication 1000 MILLIGRAM(S): at 00:09

## 2023-04-26 NOTE — PROCEDURE NOTE - PROCEDURE FINDINGS AND DETAILS
US guided aspiration of RLQ colleciton yielded 4cc magan slightly cloudy fluid. Sample collected for micro., No drain placed at this time

## 2023-04-26 NOTE — CHART NOTE - NSCHARTNOTEFT_GEN_A_CORE
POST-OPERATIVE CHECK    Patient is s/p IR aspiration of anterior collection    Subjective:  Patient just woke up from a nap, feeling very well  Patient reports having minimal pain  Patient reports passing gas and ambulating on her own  Patient has not had anything to eat since the procedure, but did have some water which she tolerated well    PAST MEDICAL & SURGICAL HISTORY:  Hirschsprung's disease  No significant past surgical history      Objective:  Vital Signs Last 24 Hrs  T(C): 37.1 (26 Apr 2023 18:00), Max: 37.3 (26 Apr 2023 06:00)  T(F): 98.8 (26 Apr 2023 18:00), Max: 99.2 (26 Apr 2023 06:00)  HR: 74 (26 Apr 2023 18:00) (74 - 92)  BP: 120/80 (26 Apr 2023 18:00) (97/50 - 120/80)  BP(mean): --  RR: 16 (26 Apr 2023 18:00) (16 - 18)  SpO2: 98% (26 Apr 2023 18:00) (95% - 99%)    Parameters below as of 26 Apr 2023 18:00  Patient On (Oxygen Delivery Method): room air      PHYSICAL EXAM:  Constitutional: NAD  Neuro: AOx3, no focal deficits  Respiratory:  Normal respiratory effort  Cardiovascular: Warm and well-perfused  Gastrointestinal: Abdomen soft, non distended, mildly tender in the RQs, Incisions C/D/I, REMI in RLQ with minimal serous output      I&O's Detail    25 Apr 2023 07:01  -  26 Apr 2023 07:00  --------------------------------------------------------  IN:    dextrose 5% + sodium chloride 0.45% w/ Additives: 2300 mL    IV PiggyBack: 200 mL    Oral Fluid: 480 mL  Total IN: 2980 mL    OUT:    Drain (mL): 15 mL    Voided (mL): 600 mL  Total OUT: 615 mL    Total NET: 2365 mL      26 Apr 2023 07:01  -  26 Apr 2023 20:31  --------------------------------------------------------  IN:    dextrose 5% + sodium chloride 0.45% w/ Additives: 1200 mL    Oral Fluid: 100 mL  Total IN: 1300 mL    OUT:    Drain (mL): 23 mL  Total OUT: 23 mL    Total NET: 1277 mL        LABS:                        9.5    15.09 )-----------( 543      ( 26 Apr 2023 06:12 )             29.5     04-26    139  |  102  |  2<L>  ----------------------------<  103<H>  4.4   |  23  |  0.65    Ca    9.3      26 Apr 2023 06:12  Phos  3.8     04-26  Mg     2.10     04-26      PT/INR - ( 26 Apr 2023 12:04 )   PT: 17.8 sec;   INR: 1.53 ratio         PTT - ( 26 Apr 2023 12:04 )  PTT:32.4 sec    CAPILLARY BLOOD GLUCOSE      enoxaparin Injectable 40        Assessment:  The patient is a 23y Female who is now several hours post-op from an IR aspiration    Plan:  - Pain control as needed  - CLD  - DVT ppx  - F/u IR aspiration cultures  - OOB and ambulating as tolerated    Surgery A Team  [id number]
PRE-INTERVENTIONAL RADIOLOGY PROCEDURE NOTE      Patient Age: 23 year old    Patient Gender: female    Procedure: RLQ aspiration vs drainage of fluid collection    Diagnosis/Indication: anterior abdominal fluid collection     Interventional Radiology Attending Physician: Maryam    Ordering Attending Physician: Taylor Flannery    Pertinent Medical History: perforated appendicitis s/p laparoscopic appendectomy and partial cecectomy    Pertinent labs:                      9.5    15.09 )-----------( 543      ( 26 Apr 2023 06:12 )             29.5       04-26    139  |  102  |  2<L>  ----------------------------<  103<H>  4.4   |  23  |  0.65    Ca    9.3      26 Apr 2023 06:12  Phos  3.8     04-26  Mg     2.10     04-26        PT/INR - ( 26 Apr 2023 06:12 )   PT: 19.6 sec;   INR: 1.68 ratio         PTT - ( 26 Apr 2023 06:12 )  PTT:34.3 sec        Patient and Family Aware ? Yes
POST-OPERATIVE NOTE    Patient is s/p Laparoscopic Appendectomy & cecectomy    Subjective:  Patient reports pain at the incisional site, controlled with medication  Denies chest pain, shortness of breath, nausea, vomiting  Is not yet passing gas or having bowel movements  Patient reports urinating and ambulating independently    Vital Signs Last 24 Hrs  T(C): 36.8 (20 Apr 2023 18:00), Max: 39.5 (20 Apr 2023 09:46)  T(F): 98.2 (20 Apr 2023 18:00), Max: 103.1 (20 Apr 2023 09:46)  HR: 64 (20 Apr 2023 18:00) (63 - 121)  BP: 104/60 (20 Apr 2023 18:00) (100/60 - 123/80)  BP(mean): 74 (20 Apr 2023 16:15) (63 - 74)  RR: 17 (20 Apr 2023 18:00) (15 - 20)  SpO2: 99% (20 Apr 2023 18:00) (97% - 100%)    Parameters below as of 20 Apr 2023 18:00  Patient On (Oxygen Delivery Method): room air      I&O's Detail    19 Apr 2023 07:01  -  20 Apr 2023 07:00  --------------------------------------------------------  IN:    IV PiggyBack: 100 mL    Lactated Ringers: 2000 mL    Oral Fluid: 400 mL  Total IN: 2500 mL    OUT:    Blood Loss (mL): 0 mL  Total OUT: 0 mL    Total NET: 2500 mL      20 Apr 2023 07:01  -  20 Apr 2023 21:32  --------------------------------------------------------  IN:    Lactated Ringers: 400 mL    Oral Fluid: 350 mL  Total IN: 750 mL    OUT:    Blood Loss (mL): 5 mL    Voided (mL): 350 mL  Total OUT: 355 mL    Total NET: 395 mL          Physical Exam:  General: NAD, resting comfortably in bed, A&Ox3  Pulmonary: Nonlabored breathing, no respiratory distress  Abdominal: soft, appropriately tender, ,mildly distended, incisions c/d/i  Extremities: WWP      LABS:                        11.4   19.79 )-----------( 391      ( 20 Apr 2023 08:52 )             35.3     04-20    139  |  102  |  4<L>  ----------------------------<  99  3.9   |  25  |  0.77    Ca    9.2      20 Apr 2023 00:30  Phos  3.6     04-20  Mg     2.00     04-20      PT/INR - ( 20 Apr 2023 00:30 )   PT: 14.1 sec;   INR: 1.21 ratio         PTT - ( 20 Apr 2023 00:30 )  PTT:31.0 sec        A-Team  [id number]

## 2023-04-26 NOTE — PROGRESS NOTE ADULT - SUBJECTIVE AND OBJECTIVE BOX
Surgery Progress Note     Overnight Events: CT A/P done w/ findings of 2.9 x 2.8 x 3.8 cm rim enhancing anterior fluid collection in the RLQ, may connect to another rim enhancing collection more medial and posterior, measuring 2.4 x 2.7 x 5.8 cm     Subjective:  Patient seen and examined on AM rounds. Patient had abdominal pain and chills overnight. Denies nausea, vomiting. Tolerated diet yesterday evening without pain, now NPO for possible IR.      Vital Signs:  Vital Signs Last 24 Hrs  T(C): 37.3 (26 Apr 2023 06:00), Max: 37.3 (25 Apr 2023 09:57)  T(F): 99.2 (26 Apr 2023 06:00), Max: 99.2 (25 Apr 2023 09:57)  HR: 92 (26 Apr 2023 06:00) (76 - 92)  BP: 109/72 (26 Apr 2023 06:00) (91/56 - 114/65)  BP(mean): --  RR: 18 (26 Apr 2023 06:00) (16 - 18)  SpO2: 95% (26 Apr 2023 06:00) (95% - 100%)    Parameters below as of 26 Apr 2023 06:00  Patient On (Oxygen Delivery Method): room air        CAPILLARY BLOOD GLUCOSE          I&O's Detail    25 Apr 2023 07:01  -  26 Apr 2023 07:00  --------------------------------------------------------  IN:    dextrose 5% + sodium chloride 0.45% w/ Additives: 2300 mL    IV PiggyBack: 200 mL    Oral Fluid: 480 mL  Total IN: 2980 mL    OUT:    Drain (mL): 15 mL    Voided (mL): 600 mL  Total OUT: 615 mL    Total NET: 2365 mL            Physical Exam:  General: resting in bed  HEENT: Normocephalic atraumatic  Respiratory: Nonlabored respirations  Cardio: regular rate  Abdomen: soft, nondistended, R sided tenderness RLQ>RUQ, REMI w/ purulent output  Vascular: extremities are warm and well perfused      Labs:    04-25    137  |  104  |  2<L>  ----------------------------<  114<H>  4.1   |  23  |  0.66    Ca    8.7      25 Apr 2023 07:24  Phos  3.6     04-25  Mg     1.90     04-25                              9.5    15.09 )-----------( 543      ( 26 Apr 2023 06:12 )             29.5     PT/INR - ( 26 Apr 2023 06:12 )   PT: 19.6 sec;   INR: 1.68 ratio         PTT - ( 26 Apr 2023 06:12 )  PTT:34.3 sec

## 2023-04-26 NOTE — CONSULT NOTE ADULT - ASSESSMENT
Interventional Radiology    Evaluate for Procedure:     HPI: 23 year old female with PMH Hirshsprung s/p pull through and rectoplasty readmitted for perforated appendicitis. 4/20 S/p laparoscopic cholecystectomy and partial cecectomy. Postoperative course complicated by persistent fevers (negative UA, chest xray with mild atelectasis). CT A/P done on 4/25 w/ findings of 2.9 x 2.8 x 3.8 cm rim enhancing anterior fluid collection in the RLQ,and additional larger r rim enhancing collection more medial and posterior, measuring 2.4 x 2.7 x 5.8 cm, which may be in communication with anterior abscess.     IR consulted fro evaluation of abdominal fluid collections/ abscesses.       Allergies: No Known Allergies    Medications (Abx/Cardiac/Anticoagulation/Blood Products)    heparin   Injectable: 5000 Unit(s) SubCutaneous (04-24 @ 12:55)  piperacillin/tazobactam IVPB..: 25 mL/Hr IV Intermittent (04-26 @ 05:37)    Data:    T(C): 37.3  HR: 92  BP: 109/72  RR: 18  SpO2: 95%    -WBC 15.09 / HgB 9.5 / Hct 29.5 / Plt 543  -Na 139 / Cl 102 / BUN 2 / Glucose 103  -K 4.4 / CO2 23 / Cr 0.65  -ALT -- / Alk Phos -- / T.Bili --  -INR 1.68 / PTT 34.3    Radiology:     Reviewed with attending     Assessment/Plan:   -23 year old female with PMH Hirshsprung s/p pull through and rectoplasty readmitted for perforated appendicitis. 4/20 S/p laparoscopic cholecystectomy and partial cecectomy. Postoperative course complicated by persistent fevers (negative UA, chest xray with mild atelectasis). CT A/P done on 4/25 w/ findings of 2.9 x 2.8 x 3.8 cm rim enhancing anterior fluid collection in the RLQ,and additional larger r rim enhancing collection more medial and posterior, measuring 2.4 x 2.7 x 5.8 cm, which may be in communication with anterior abscess.     IR consulted fro evaluation of abdominal fluid collections/ abscesses.     Overnight CT AP reviewed. There is a small anterior collection in the RLQ that does display percutaneous window, however due to size, a drain may not be able to be placed into the fluid cavity. In addition, there is larger rim enhancing collection deeper in abdominal cavity that is no accessible percutaneously. These findings were discussed between IR attending Dr. Francisco and Colorectal Surgery attending Dr. Bo.       - IR to attempt aspiration vs drainage of RLQ anterior collection   - case reviewed and approved for Add on for TODAY 4/26/23   - please place IR procedure order under Dr. Francisco   -STAT COAGS given most recent INR   - NPO     - d/w primary team    Case d/w Dr. Maryam Piña MD PGY-2

## 2023-04-26 NOTE — PROGRESS NOTE ADULT - ASSESSMENT
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 4/20 S/p laparoscopic cholecystectomy and partial cecectomy. Postoperative course complicated by persistent fevers (negative UA, chest xray with mild atelectasis). CT 4/25 with anterior and medial posterior RLQ abscesses.    PLAN:  - f/u IR consult  - NPO/IVF  - IV vit K for elevated INR 1.68  - holding DVT for possible IR  - Pain control  - Tylenol and ibuprofen for antipyretic   - Zosyn  - Monitor fever curve, REMI drain output    A Team Surgery  [id number] 23 year old female with PMH Hirshsprung s/p pull through and rectoplasty readmitted for perforated appendicitis. 4/20 S/p laparoscopic appendectomy and partial cecectomy. Postoperative course complicated by persistent fevers (negative UA, chest xray with mild atelectasis). CT 4/25 with anterior and medial posterior RLQ abscesses.    PLAN:  - f/u IR consult  - NPO/IVF  - IV vit K for elevated INR 1.68  - holding DVT for possible IR  - Pain control  - Tylenol and ibuprofen for antipyretic   - Zosyn  - Monitor fever curve, REMI drain output    A Team Surgery  [id number]

## 2023-04-26 NOTE — PRE PROCEDURE NOTE - HISTORY OF PRESENT ILLNESS
Interventional Radiology  Pre-Procedure Note    This is a 23y  Female  presenting     HPI:  23F pmh Hirschsprung (s/p pull through/rectoplasty) and recent hx (1 week ago) of perforated appendicitis with appendicolith and possible infection extending to fallopian tube tx presenting with IV abx presenting 1 with abdominal pain, fever to 101 and nausea for last two days. Currently on PO Augmentin course post hospitalization, set to finish on Friday.  Reports poor PO intake. Passing flatus and having BM. CT with noted worsened appendicitis with persistent appendicolith and possible 3.5 x 2.5 abscess.  (18 Apr 2023 22:57)  Pt is s/p appendectomy with CT demonstrating RLQ collection    PAST MEDICAL & SURGICAL HISTORY:  Hirschsprung's disease      No significant past surgical history          Social History:     FAMILY HISTORY:      Allergies: No Known Allergies      Current Medications: acetaminophen     Tablet .. 1000 milliGRAM(s) Oral every 6 hours  dextrose 5% + sodium chloride 0.45% 1000 milliLiter(s) IV Continuous <Continuous>  ibuprofen  Tablet. 400 milliGRAM(s) Oral every 6 hours PRN  oxyCODONE    IR 2.5 milliGRAM(s) Oral every 4 hours PRN  oxyCODONE    IR 5 milliGRAM(s) Oral every 4 hours PRN      Labs:                         9.5    15.09 )-----------( 543      ( 26 Apr 2023 06:12 )             29.5       04-26    139  |  102  |  2<L>  ----------------------------<  103<H>  4.4   |  23  |  0.65    Ca    9.3      26 Apr 2023 06:12  Phos  3.8     04-26  Mg     2.10     04-26        HCG: HCG Quantitative, Serum: <5.0 mIU/mL (04-26 @ 06:12)    Radiology:  < from: CT Abdomen and Pelvis w/ IV Cont (04.25.23 @ 16:56) >    ACC: [id number] EXAM:  CT ABDOMEN AND PELVIS IC   ORDERED BY: ERLINDA TOPETE     PROCEDURE DATE:  04/25/2023          INTERPRETATION:  CLINICAL INFORMATION: Leukocytosis and fevers following   laparoscopic appendectomy.    COMPARISON: CT abdomen pelvisdated 4/7/2023 and 4/18/2023.    CONTRAST/COMPLICATIONS:  IV Contrast: Omnipaque 350  90 cc administered   10 cc discarded  Oral Contrast: NONE  Complications: None reported at time of study completion    PROCEDURE:  CT of the Abdomen and Pelvis was performed.  Sagittal and coronal reformats were performed.    FINDINGS:  LOWER CHEST: Bibasilar dependent atelectasis. Small bilateral pleural   effusions.    LIVER: Hepatomegaly. Periportal edema.  BILE DUCTS: Normal caliber.  GALLBLADDER: Within normal limits.  SPLEEN: Within normal limits.  PANCREAS: Within normal limits.  ADRENALS: Within normal limits.  KIDNEYS/URETERS: Within normal limits.    BLADDER: Within normal limits.  REPRODUCTIVE ORGANS: Tubular structure containing fluid and air in the   right adnexa suspicious for hydrosalpinx, similar to prior studies.    BOWEL: Interval appendectomy and partial cecectomy. Small bowel ileus.  PERITONEUM: Trace pelvic ascites. Loculated fluid collections in the   right lower quadrant:  Anterior(2, 112) measures 3.8 x 2.9 x 2.8 cm  Posterior (2, 106) measures 5.8 x 2.7 x 2.4 cm  Surgical drain coiled in the anterior pelvis.  VESSELS: Circumaortic left renal vein.  RETROPERITONEUM/LYMPH NODES: No lymphadenopathy.  ABDOMINAL WALL: Dependent subcutaneous edema. Small fat-containing   infraumbilical hernia.  BONES: Within normal limits.    IMPRESSION:  Interval appendectomy. Loculated fluid collections in the right lower   quadrant, cannot exclude abscess.    Persistent right hydrosalpinx/TOA.        --- End of Report ---            ANGELA MENDEZ MD; Attending Radiologist  This document has been electronically signed. Apr 26 2023  3:06PM    < end of copied text >      Assessment/Plan:   This is a 23y Female  presents with RLQ collection  Patient presents to IR for drainage. The case was discussed with DR Conway. There is a smaller superficial fluid collection and a deep collection. Only the superficial collection is amenable to percutaneous access / drainage. Also, given the size of the collection, only aspiration may be feasible and not drainage catheter insertion. This was discussed with DR Conway who agrees with aspiration for culture if drain cannot be placed.   Procedure/ risks/ benefits/ goals/ alternatives were explained. All questions answered. Informed content obtained from patient. Consent placed in chart.

## 2023-04-27 LAB
ANION GAP SERPL CALC-SCNC: 12 MMOL/L — SIGNIFICANT CHANGE UP (ref 7–14)
BUN SERPL-MCNC: 4 MG/DL — LOW (ref 7–23)
CALCIUM SERPL-MCNC: 9.1 MG/DL — SIGNIFICANT CHANGE UP (ref 8.4–10.5)
CHLORIDE SERPL-SCNC: 102 MMOL/L — SIGNIFICANT CHANGE UP (ref 98–107)
CO2 SERPL-SCNC: 20 MMOL/L — LOW (ref 22–31)
CREAT SERPL-MCNC: 0.56 MG/DL — SIGNIFICANT CHANGE UP (ref 0.5–1.3)
EGFR: 131 ML/MIN/1.73M2 — SIGNIFICANT CHANGE UP
GLUCOSE SERPL-MCNC: 100 MG/DL — HIGH (ref 70–99)
HCT VFR BLD CALC: 28.5 % — LOW (ref 34.5–45)
HGB BLD-MCNC: 9.3 G/DL — LOW (ref 11.5–15.5)
MAGNESIUM SERPL-MCNC: 2.1 MG/DL — SIGNIFICANT CHANGE UP (ref 1.6–2.6)
MCHC RBC-ENTMCNC: 28.4 PG — SIGNIFICANT CHANGE UP (ref 27–34)
MCHC RBC-ENTMCNC: 32.6 GM/DL — SIGNIFICANT CHANGE UP (ref 32–36)
MCV RBC AUTO: 87.2 FL — SIGNIFICANT CHANGE UP (ref 80–100)
NRBC # BLD: 0 /100 WBCS — SIGNIFICANT CHANGE UP (ref 0–0)
NRBC # FLD: 0 K/UL — SIGNIFICANT CHANGE UP (ref 0–0)
PHOSPHATE SERPL-MCNC: 4 MG/DL — SIGNIFICANT CHANGE UP (ref 2.5–4.5)
PLATELET # BLD AUTO: 540 K/UL — HIGH (ref 150–400)
POTASSIUM SERPL-MCNC: 4.1 MMOL/L — SIGNIFICANT CHANGE UP (ref 3.5–5.3)
POTASSIUM SERPL-SCNC: 4.1 MMOL/L — SIGNIFICANT CHANGE UP (ref 3.5–5.3)
RBC # BLD: 3.27 M/UL — LOW (ref 3.8–5.2)
RBC # FLD: 12.9 % — SIGNIFICANT CHANGE UP (ref 10.3–14.5)
SODIUM SERPL-SCNC: 134 MMOL/L — LOW (ref 135–145)
WBC # BLD: 15.42 K/UL — HIGH (ref 3.8–10.5)
WBC # FLD AUTO: 15.42 K/UL — HIGH (ref 3.8–10.5)

## 2023-04-27 RX ORDER — PIPERACILLIN AND TAZOBACTAM 4; .5 G/20ML; G/20ML
3.38 INJECTION, POWDER, LYOPHILIZED, FOR SOLUTION INTRAVENOUS EVERY 8 HOURS
Refills: 0 | Status: DISCONTINUED | OUTPATIENT
Start: 2023-04-27 | End: 2023-04-29

## 2023-04-27 RX ADMIN — ENOXAPARIN SODIUM 40 MILLIGRAM(S): 100 INJECTION SUBCUTANEOUS at 18:25

## 2023-04-27 RX ADMIN — Medication 1000 MILLIGRAM(S): at 18:54

## 2023-04-27 RX ADMIN — Medication 1000 MILLIGRAM(S): at 18:24

## 2023-04-27 RX ADMIN — Medication 1000 MILLIGRAM(S): at 10:56

## 2023-04-27 RX ADMIN — Medication 1000 MILLIGRAM(S): at 00:53

## 2023-04-27 RX ADMIN — PIPERACILLIN AND TAZOBACTAM 25 GRAM(S): 4; .5 INJECTION, POWDER, LYOPHILIZED, FOR SOLUTION INTRAVENOUS at 22:48

## 2023-04-27 RX ADMIN — Medication 1000 MILLIGRAM(S): at 01:00

## 2023-04-27 RX ADMIN — SODIUM CHLORIDE 50 MILLILITER(S): 9 INJECTION, SOLUTION INTRAVENOUS at 10:27

## 2023-04-27 RX ADMIN — Medication 1000 MILLIGRAM(S): at 10:26

## 2023-04-27 RX ADMIN — PIPERACILLIN AND TAZOBACTAM 25 GRAM(S): 4; .5 INJECTION, POWDER, LYOPHILIZED, FOR SOLUTION INTRAVENOUS at 06:08

## 2023-04-27 RX ADMIN — PIPERACILLIN AND TAZOBACTAM 25 GRAM(S): 4; .5 INJECTION, POWDER, LYOPHILIZED, FOR SOLUTION INTRAVENOUS at 13:52

## 2023-04-27 NOTE — PROGRESS NOTE ADULT - SUBJECTIVE AND OBJECTIVE BOX
A Team General Surgery Progress Note    S: Pt seen and examined with team on morning rounds. Patient claims pain control is better. Tolerated some water. Denies nausea/emesis.       Vital Signs Last 24 Hrs  T(C): 36.7 (27 Apr 2023 06:18), Max: 37.4 (27 Apr 2023 02:00)  T(F): 98 (27 Apr 2023 06:18), Max: 99.3 (27 Apr 2023 02:00)  HR: 90 (27 Apr 2023 06:18) (74 - 92)  BP: 98/63 (27 Apr 2023 06:18) (98/63 - 120/80)  BP(mean): --  RR: 18 (27 Apr 2023 06:18) (16 - 18)  SpO2: 99% (27 Apr 2023 06:18) (96% - 99%)    Parameters below as of 27 Apr 2023 06:18  Patient On (Oxygen Delivery Method): room air        I&O's Summary    26 Apr 2023 07:01  -  27 Apr 2023 07:00  --------------------------------------------------------  IN: 1300 mL / OUT: 423 mL / NET: 877 mL      I&O's Detail    26 Apr 2023 07:01  -  27 Apr 2023 07:00  --------------------------------------------------------  IN:    dextrose 5% + sodium chloride 0.45% w/ Additives: 1200 mL    Oral Fluid: 100 mL  Total IN: 1300 mL    OUT:    Drain (mL): 23 mL    Voided (mL): 400 mL  Total OUT: 423 mL    Total NET: 877 mL          General Appearance: Appears well, NAD  Chest: Breathing comfortably on RA.    CV: S1, S2 @ 90  Abdomen: Soft, nondistended.+ tenderness lower abdomen. Surgical sites clean/dry/intact. REMI drain seropurulent. R sided IR aspiration site dressing c/d/i.  Extremities: Moves all extremities.  .  MEDICATIONS  (STANDING):  acetaminophen     Tablet .. 1000 milliGRAM(s) Oral every 6 hours  dextrose 5% + sodium chloride 0.45% 1000 milliLiter(s) (100 mL/Hr) IV Continuous <Continuous>  enoxaparin Injectable 40 milliGRAM(s) SubCutaneous every 24 hours  piperacillin/tazobactam IVPB.. 3.375 Gram(s) IV Intermittent every 8 hours    MEDICATIONS  (PRN):  ibuprofen  Tablet. 400 milliGRAM(s) Oral every 6 hours PRN Temp greater or equal to 38C (100.4F), Mild Pain (1 - 3)  oxyCODONE    IR 5 milliGRAM(s) Oral every 4 hours PRN Severe Pain (7 - 10)  oxyCODONE    IR 2.5 milliGRAM(s) Oral every 4 hours PRN Moderate Pain (4 - 6)      LABS:                        9.3    15.42 )-----------( 540      ( 27 Apr 2023 05:53 )             28.5     04-27    134<L>  |  102  |  4<L>  ----------------------------<  100<H>  4.1   |  20<L>  |  0.56    Ca    9.1      27 Apr 2023 05:53  Phos  4.0     04-27  Mg     2.10     04-27      PT/INR - ( 26 Apr 2023 12:04 )   PT: 17.8 sec;   INR: 1.53 ratio         PTT - ( 26 Apr 2023 12:04 )  PTT:32.4 sec

## 2023-04-27 NOTE — PROGRESS NOTE ADULT - ASSESSMENT
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 4/20 S/p laparoscopic appendectomy and partial cecectomy. Postoperative course complicated by persistent fevers (negative UA, chest xray with mild atelectasis). CT 4/25 with anterior and medial posterior RLQ abscesses. 4/26 IR aspiration of anterior collection.    PLAN:  - f/u IR cultures for antibiotic plan  - Possible advance to LRD   - Decrease IVF  - Pain control  - Tylenol and ibuprofen for antipyretic   - Zosyn  - Monitor fever curve, REMI drain output    A Team Surgery  [id number]

## 2023-04-28 LAB
ANION GAP SERPL CALC-SCNC: 16 MMOL/L — HIGH (ref 7–14)
BUN SERPL-MCNC: 8 MG/DL — SIGNIFICANT CHANGE UP (ref 7–23)
CALCIUM SERPL-MCNC: 10 MG/DL — SIGNIFICANT CHANGE UP (ref 8.4–10.5)
CHLORIDE SERPL-SCNC: 104 MMOL/L — SIGNIFICANT CHANGE UP (ref 98–107)
CO2 SERPL-SCNC: 20 MMOL/L — LOW (ref 22–31)
CREAT SERPL-MCNC: 0.65 MG/DL — SIGNIFICANT CHANGE UP (ref 0.5–1.3)
EGFR: 127 ML/MIN/1.73M2 — SIGNIFICANT CHANGE UP
GLUCOSE SERPL-MCNC: 75 MG/DL — SIGNIFICANT CHANGE UP (ref 70–99)
HCT VFR BLD CALC: 33.3 % — LOW (ref 34.5–45)
HGB BLD-MCNC: 10.5 G/DL — LOW (ref 11.5–15.5)
MAGNESIUM SERPL-MCNC: 2.2 MG/DL — SIGNIFICANT CHANGE UP (ref 1.6–2.6)
MCHC RBC-ENTMCNC: 29.2 PG — SIGNIFICANT CHANGE UP (ref 27–34)
MCHC RBC-ENTMCNC: 31.5 GM/DL — LOW (ref 32–36)
MCV RBC AUTO: 92.5 FL — SIGNIFICANT CHANGE UP (ref 80–100)
NRBC # BLD: 0 /100 WBCS — SIGNIFICANT CHANGE UP (ref 0–0)
NRBC # FLD: 0 K/UL — SIGNIFICANT CHANGE UP (ref 0–0)
PHOSPHATE SERPL-MCNC: 4.2 MG/DL — SIGNIFICANT CHANGE UP (ref 2.5–4.5)
PLATELET # BLD AUTO: 657 K/UL — HIGH (ref 150–400)
POTASSIUM SERPL-MCNC: 4.5 MMOL/L — SIGNIFICANT CHANGE UP (ref 3.5–5.3)
POTASSIUM SERPL-SCNC: 4.5 MMOL/L — SIGNIFICANT CHANGE UP (ref 3.5–5.3)
RBC # BLD: 3.6 M/UL — LOW (ref 3.8–5.2)
RBC # FLD: 13 % — SIGNIFICANT CHANGE UP (ref 10.3–14.5)
SODIUM SERPL-SCNC: 140 MMOL/L — SIGNIFICANT CHANGE UP (ref 135–145)
WBC # BLD: 12.9 K/UL — HIGH (ref 3.8–10.5)
WBC # FLD AUTO: 12.9 K/UL — HIGH (ref 3.8–10.5)

## 2023-04-28 RX ADMIN — PIPERACILLIN AND TAZOBACTAM 25 GRAM(S): 4; .5 INJECTION, POWDER, LYOPHILIZED, FOR SOLUTION INTRAVENOUS at 05:53

## 2023-04-28 RX ADMIN — PIPERACILLIN AND TAZOBACTAM 25 GRAM(S): 4; .5 INJECTION, POWDER, LYOPHILIZED, FOR SOLUTION INTRAVENOUS at 14:37

## 2023-04-28 RX ADMIN — PIPERACILLIN AND TAZOBACTAM 25 GRAM(S): 4; .5 INJECTION, POWDER, LYOPHILIZED, FOR SOLUTION INTRAVENOUS at 21:51

## 2023-04-28 RX ADMIN — Medication 400 MILLIGRAM(S): at 02:40

## 2023-04-28 RX ADMIN — ENOXAPARIN SODIUM 40 MILLIGRAM(S): 100 INJECTION SUBCUTANEOUS at 19:27

## 2023-04-28 RX ADMIN — Medication 1000 MILLIGRAM(S): at 06:23

## 2023-04-28 RX ADMIN — Medication 400 MILLIGRAM(S): at 02:10

## 2023-04-28 RX ADMIN — Medication 1000 MILLIGRAM(S): at 05:53

## 2023-04-28 NOTE — PROGRESS NOTE ADULT - ASSESSMENT
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 4/20 S/p laparoscopic appendectomy and partial cecectomy. Postoperative course complicated by persistent fevers (negative UA, chest xray with mild atelectasis). CT 4/25 with anterior and medial posterior RLQ collections, s/p IR aspiration of anterior collection 4/26.    PLAN:  - f/u IR cultures for antibiotic plan  - LRD  - Pain control w/ tylenol, ibuprofen  - Continue zosyn  - Monitor fever curve, REMI drain output  - Dispo planning    A Team Surgery  [id number]

## 2023-04-28 NOTE — PROGRESS NOTE ADULT - SUBJECTIVE AND OBJECTIVE BOX
Surgery Progress Note     Overnight Events: none    Subjective:  Patient seen and examined. Patient reports abdominal pain has improved, denies nausea, vomiting, fevers, chills. Tolerating diet.      Vital Signs:  Vital Signs Last 24 Hrs  T(C): 36.4 (28 Apr 2023 07:56), Max: 36.8 (28 Apr 2023 02:00)  T(F): 97.6 (28 Apr 2023 07:56), Max: 98.3 (28 Apr 2023 02:00)  HR: 72 (28 Apr 2023 07:56) (72 - 91)  BP: 112/63 (28 Apr 2023 07:56) (98/69 - 112/63)  BP(mean): --  RR: 18 (28 Apr 2023 07:56) (17 - 19)  SpO2: 99% (28 Apr 2023 07:56) (96% - 99%)    Parameters below as of 28 Apr 2023 07:56  Patient On (Oxygen Delivery Method): room air        CAPILLARY BLOOD GLUCOSE          I&O's Detail    27 Apr 2023 07:01  -  28 Apr 2023 07:00  --------------------------------------------------------  IN:    dextrose 5% + sodium chloride 0.45% w/ Additives: 400 mL    IV PiggyBack: 100 mL    Oral Fluid: 1680 mL  Total IN: 2180 mL    OUT:    Drain (mL): 20 mL    Voided (mL): 500 mL  Total OUT: 520 mL    Total NET: 1660 mL            Physical Exam:  General: NAD, resting comfortably in bed  HEENT: Normocephalic atraumatic  Respiratory: Nonlabored respirations  Cardio: regular rate  Abdomen: soft, nondistended, mild RLQ tenderness, REMI w/ minimal seropurulent output  Vascular: extremities are warm and well perfused      Labs:    04-28    140  |  104  |  x   ----------------------------<  x   4.5   |  x   |  x     Ca    9.1      27 Apr 2023 05:53  Phos  4.2     04-28  Mg     2.20     04-28                              10.5   12.90 )-----------( 657      ( 28 Apr 2023 06:00 )             33.3     PT/INR - ( 26 Apr 2023 12:04 )   PT: 17.8 sec;   INR: 1.53 ratio         PTT - ( 26 Apr 2023 12:04 )  PTT:32.4 sec

## 2023-04-29 ENCOUNTER — TRANSCRIPTION ENCOUNTER (OUTPATIENT)
Age: 23
End: 2023-04-29

## 2023-04-29 VITALS
DIASTOLIC BLOOD PRESSURE: 60 MMHG | SYSTOLIC BLOOD PRESSURE: 99 MMHG | HEART RATE: 72 BPM | RESPIRATION RATE: 18 BRPM | OXYGEN SATURATION: 100 % | TEMPERATURE: 98 F

## 2023-04-29 LAB
ANION GAP SERPL CALC-SCNC: 17 MMOL/L — HIGH (ref 7–14)
BUN SERPL-MCNC: 7 MG/DL — SIGNIFICANT CHANGE UP (ref 7–23)
CALCIUM SERPL-MCNC: 9.7 MG/DL — SIGNIFICANT CHANGE UP (ref 8.4–10.5)
CHLORIDE SERPL-SCNC: 102 MMOL/L — SIGNIFICANT CHANGE UP (ref 98–107)
CO2 SERPL-SCNC: 19 MMOL/L — LOW (ref 22–31)
CREAT SERPL-MCNC: 0.67 MG/DL — SIGNIFICANT CHANGE UP (ref 0.5–1.3)
EGFR: 126 ML/MIN/1.73M2 — SIGNIFICANT CHANGE UP
GLUCOSE SERPL-MCNC: 99 MG/DL — SIGNIFICANT CHANGE UP (ref 70–99)
HCT VFR BLD CALC: 33 % — LOW (ref 34.5–45)
HGB BLD-MCNC: 10.8 G/DL — LOW (ref 11.5–15.5)
MAGNESIUM SERPL-MCNC: 2 MG/DL — SIGNIFICANT CHANGE UP (ref 1.6–2.6)
MCHC RBC-ENTMCNC: 29.3 PG — SIGNIFICANT CHANGE UP (ref 27–34)
MCHC RBC-ENTMCNC: 32.7 GM/DL — SIGNIFICANT CHANGE UP (ref 32–36)
MCV RBC AUTO: 89.4 FL — SIGNIFICANT CHANGE UP (ref 80–100)
NRBC # BLD: 0 /100 WBCS — SIGNIFICANT CHANGE UP (ref 0–0)
NRBC # FLD: 0 K/UL — SIGNIFICANT CHANGE UP (ref 0–0)
PHOSPHATE SERPL-MCNC: 4.1 MG/DL — SIGNIFICANT CHANGE UP (ref 2.5–4.5)
PLATELET # BLD AUTO: 754 K/UL — HIGH (ref 150–400)
POTASSIUM SERPL-MCNC: 4 MMOL/L — SIGNIFICANT CHANGE UP (ref 3.5–5.3)
POTASSIUM SERPL-SCNC: 4 MMOL/L — SIGNIFICANT CHANGE UP (ref 3.5–5.3)
RBC # BLD: 3.69 M/UL — LOW (ref 3.8–5.2)
RBC # FLD: 12.8 % — SIGNIFICANT CHANGE UP (ref 10.3–14.5)
SODIUM SERPL-SCNC: 138 MMOL/L — SIGNIFICANT CHANGE UP (ref 135–145)
WBC # BLD: 14.09 K/UL — HIGH (ref 3.8–10.5)
WBC # FLD AUTO: 14.09 K/UL — HIGH (ref 3.8–10.5)

## 2023-04-29 RX ADMIN — PIPERACILLIN AND TAZOBACTAM 25 GRAM(S): 4; .5 INJECTION, POWDER, LYOPHILIZED, FOR SOLUTION INTRAVENOUS at 05:17

## 2023-04-29 NOTE — DISCHARGE NOTE NURSING/CASE MANAGEMENT/SOCIAL WORK - NSDCPEFALRISK_GEN_ALL_CORE
For information on Fall & Injury Prevention, visit: [email or web address] OR  [email or web address] OR  https://www.cdc.gov/steadi/patient.html

## 2023-04-29 NOTE — PROGRESS NOTE ADULT - PROVIDER SPECIALTY LIST ADULT
Colorectal Surgery
Colorectal Surgery
Surgery
Surgery
Anesthesia
Colorectal Surgery
Colorectal Surgery
Surgery
Colorectal Surgery
Colorectal Surgery

## 2023-04-29 NOTE — PROGRESS NOTE ADULT - REASON FOR ADMISSION
appendicitis

## 2023-04-29 NOTE — DISCHARGE NOTE NURSING/CASE MANAGEMENT/SOCIAL WORK - PATIENT PORTAL LINK FT
You can access the FollowMyHealth Patient Portal offered by [facility] by registering at the following website: [email or web address]. By joining [employer]’s FollowMyHealth portal, you will also be able to view your health information using other applications (apps) compatible with our system.

## 2023-04-29 NOTE — DISCHARGE NOTE NURSING/CASE MANAGEMENT/SOCIAL WORK - NSDCPNINST_GEN_ALL_CORE
Please NOTIFY MD for any of the following s/s: S/S infection (Fever >100.4, chills, increased redness, increased bleeding, pus-like drainage from incision line), uncontrolled pain not relieved by pain medications, persistent nausea/vomiting or inability to tolerate diet. No heavy lifting; No driving while taking narcotic pain medications.

## 2023-04-29 NOTE — PROGRESS NOTE ADULT - SUBJECTIVE AND OBJECTIVE BOX
Overnight events:   - No acute events    SUBJECTIVE:  Patient was seen and examined on AM rounds. Reports improved abdominal pain. Denies fever/chills, SOB, N/V.    OBJECTIVE:  Vital Signs Last 24 Hrs  T(C): 36.9 (29 Apr 2023 06:00), Max: 37.5 (28 Apr 2023 18:00)  T(F): 98.5 (29 Apr 2023 06:00), Max: 99.5 (28 Apr 2023 18:00)  HR: 82 (29 Apr 2023 06:00) (67 - 109)  BP: 102/61 (29 Apr 2023 06:00) (102/58 - 111/65)  BP(mean): 71 (28 Apr 2023 22:01) (71 - 72)  RR: 17 (29 Apr 2023 06:00) (17 - 18)  SpO2: 98% (29 Apr 2023 06:00) (97% - 99%)    Parameters below as of 29 Apr 2023 06:00  Patient On (Oxygen Delivery Method): room air      04-28-23 @ 07:01  -  04-29-23 @ 07:00  --------------------------------------------------------  IN: 740 mL / OUT: 10 mL / NET: 730 mL      Physical Examination:  General: NAD, resting comfortably in bed  HEENT: Normocephalic atraumatic  Respiratory: Nonlabored respirations  Cardio: regular rate  Abdomen: soft, nondistended, mild incisional tenderness, REMI w/ minimal seropurulent output  Vascular: extremities are warm and well perfused      LABS:                        10.8   14.09 )-----------( 754      ( 29 Apr 2023 05:41 )             33.0       04-28    140  |  104  |  8   ----------------------------<  75  4.5   |  20<L>  |  0.65    Ca    10.0      28 Apr 2023 06:00  Phos  4.2     04-28  Mg     2.20     04-28         Overnight events:   - No acute events    SUBJECTIVE:  Patient was seen and examined on AM rounds. Reports improved abdominal pain. Denies fever/chills, SOB, N/V.    OBJECTIVE:  Vital Signs Last 24 Hrs  T(C): 36.9 (29 Apr 2023 06:00), Max: 37.5 (28 Apr 2023 18:00)  T(F): 98.5 (29 Apr 2023 06:00), Max: 99.5 (28 Apr 2023 18:00)  HR: 82 (29 Apr 2023 06:00) (67 - 109)  BP: 102/61 (29 Apr 2023 06:00) (102/58 - 111/65)  BP(mean): 71 (28 Apr 2023 22:01) (71 - 72)  RR: 17 (29 Apr 2023 06:00) (17 - 18)  SpO2: 98% (29 Apr 2023 06:00) (97% - 99%)    Parameters below as of 29 Apr 2023 06:00  Patient On (Oxygen Delivery Method): room air      04-28-23 @ 07:01  -  04-29-23 @ 07:00  --------------------------------------------------------  IN: 740 mL / OUT: 10 mL / NET: 730 mL      Physical Examination:  General: NAD, resting comfortably in bed  HEENT: Normocephalic atraumatic  Respiratory: Nonlabored respirations  Cardio: regular rate  Abdomen: soft, nondistended, mild incisional tenderness, REMI w/ minimal serous output  Vascular: extremities are warm and well perfused      LABS:                        10.8   14.09 )-----------( 754      ( 29 Apr 2023 05:41 )             33.0       04-28    140  |  104  |  8   ----------------------------<  75  4.5   |  20<L>  |  0.65    Ca    10.0      28 Apr 2023 06:00  Phos  4.2     04-28  Mg     2.20     04-28

## 2023-04-29 NOTE — PROGRESS NOTE ADULT - ASSESSMENT
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 4/20 S/p laparoscopic appendectomy and partial cecectomy. Postoperative course complicated by persistent fevers (negative UA, chest xray with mild atelectasis). CT 4/25 with anterior and medial posterior RLQ collections, s/p IR aspiration of anterior collection 4/26.    PLAN:  - f/u IR cultures (no growth) for antibiotic plan  - LRD  - Pain control w/ tylenol, ibuprofen  - Continue zosyn  - Monitor fever curve, REMI drain output  - Dispo planning    A Team Surgery  [id number]   23 year old female with PMH Hirshsprung s/p pull through and rectoplasty readmitted for perforated appendicitis. 4/20 S/p laparoscopic appendectomy and partial cecectomy. Postoperative course complicated by persistent fevers (negative UA, chest xray with mild atelectasis). CT 4/25 with anterior and medial posterior RLQ collections, s/p IR aspiration of anterior collection 4/26.    PLAN:  - f/u IR cultures (no growth) for antibiotic plan  - LRD  - Pain control w/ tylenol, ibuprofen  - Continue zosyn (DC on Augmentin)  - Monitor fever curve, REMI drain output  - Dispo today    A Team Surgery  [id number]

## 2023-05-04 DIAGNOSIS — K35.32 ACUTE APPENDICITIS W/ PERFORATION AND LOCALIZED PERITONITIS, W/O ABSCESS: ICD-10-CM

## 2023-05-04 RX ORDER — AMOXICILLIN AND CLAVULANATE POTASSIUM 875; 125 MG/1; MG/1
875-125 TABLET, COATED ORAL
Qty: 14 | Refills: 0 | Status: ACTIVE | COMMUNITY
Start: 2023-05-04 | End: 1900-01-01

## 2023-05-10 ENCOUNTER — APPOINTMENT (OUTPATIENT)
Dept: COLORECTAL SURGERY | Facility: CLINIC | Age: 23
End: 2023-05-10
Payer: COMMERCIAL

## 2023-05-10 DIAGNOSIS — Z78.9 OTHER SPECIFIED HEALTH STATUS: ICD-10-CM

## 2023-05-10 DIAGNOSIS — Z72.0 TOBACCO USE: ICD-10-CM

## 2023-05-10 PROCEDURE — 99024 POSTOP FOLLOW-UP VISIT: CPT

## 2023-05-10 RX ORDER — ACETAMINOPHEN 500 MG/1
TABLET ORAL
Refills: 0 | Status: ACTIVE | COMMUNITY

## 2023-05-10 NOTE — HISTORY OF PRESENT ILLNESS
[FreeTextEntry1] :  23-year-old female status post laparoscopic appendectomy recovering well. She has a drain in place the stone from the operation. It is putting out minimal serous drainage. She denies fever. Her pain is improved if not gone

## 2023-07-07 NOTE — DIETITIAN INITIAL EVALUATION ADULT - HEIGHT FOR BMI (FEET)
Goal Outcome Evaluation:Major Shift Events: Finally patient had a BM.  Neuro: Patient is A&OX4, garbled speech, moves all limbs on command, pupil equal and reactive.  Denies Headache.  Patient up with   Cards: SB/SR 50s-70s, to keep SBP<140, patient was given 10mg of laberalol for SBP>130.  Tmax of 100.5, was given tylenol and new temp was 99. Pulses are palpable, mild edema noted.  C/O HA at 6am and tylenol was given PO.  Potassium was replaced.  Resp:  Patient with 2L of O2 via NC and sats in high 90s, LS clear with diminished at bases.    GI/: Patient is on full liquid diet, HOB at 90-degrees while drinking, eating for taking meds.  Patient with mejia with ebn color urine.  And had a large BM,   Access: Patient with PIV x2 on the R-arm.     Plan: Keep SBP<140 for now.  For vital signs and complete assessments, please see documentation flowsheets.      4

## 2024-05-10 NOTE — DISCHARGE NOTE NURSING/CASE MANAGEMENT/SOCIAL WORK - HAVE YOU RECEIVED AT LEAST TWO PFIZER AND/OR MODERNA VACCINATIONS (IN ANY COMBINATION) AND/OR ONE JOHNSON & JOHNSON VACCINATION?
Yes
Management of acute medical problem, thorough review of labs and imaging.    In my medical judgement, I deem it necessary for this patient to require at least 24-48 hours of acute treatment and reassessment, event monitoring that may require immediate intervention and further diagnostic and medical evaluation to establish a definitive treatment plan.

## (undated) DEVICE — CANISTER DISPOSABLE THIN WALL 3000CC

## (undated) DEVICE — SHEARS COVIDIEN ENDO SHEAR 5MM X 31CM W UNIPOLAR CAUTERY

## (undated) DEVICE — TUBING OLYMPUS INSUFFLATION

## (undated) DEVICE — LIGASURE MARYLAND 37CM

## (undated) DEVICE — ANESTHESIA CIRCUIT ADULT HMEF

## (undated) DEVICE — TROCAR COVIDIEN BLUNT TIP HASSAN 12MM

## (undated) DEVICE — ELCTR GROUNDING PAD ADULT COVIDIEN

## (undated) DEVICE — VENODYNE/SCD SLEEVE CALF MEDIUM

## (undated) DEVICE — TUBING INSUFFLATION LAP FILTER 10FT

## (undated) DEVICE — BASIN SET SINGLE

## (undated) DEVICE — GLV 7.5 PROTEXIS (WHITE)

## (undated) DEVICE — TROCAR COVIDIEN VERSAPORT BLADELESS OPTICAL 5MM STANDARD

## (undated) DEVICE — SOL INJ NS 0.9% 1000ML

## (undated) DEVICE — PACK GENERAL LAPAROSCOPY

## (undated) DEVICE — SUT MONOCRYL 4-0 27" PS-2 UNDYED

## (undated) DEVICE — STAPLER COVIDIEN ENDO GIA STANDARD HANDLE

## (undated) DEVICE — ELCTR BOVIE PENCIL SMOKE EVACUATION

## (undated) DEVICE — BLADE SURGICAL #15 CARBON

## (undated) DEVICE — SUT VICRYL 0 27" UR-6

## (undated) DEVICE — ENDOCATCH 10MM SPECIMEN POUCH